# Patient Record
Sex: MALE | Race: WHITE | HISPANIC OR LATINO | Employment: FULL TIME | URBAN - METROPOLITAN AREA
[De-identification: names, ages, dates, MRNs, and addresses within clinical notes are randomized per-mention and may not be internally consistent; named-entity substitution may affect disease eponyms.]

---

## 2017-06-13 ENCOUNTER — OFFICE VISIT (OUTPATIENT)
Dept: URGENT CARE | Facility: URGENT CARE | Age: 22
End: 2017-06-13

## 2017-06-13 VITALS
BODY MASS INDEX: 24.61 KG/M2 | WEIGHT: 143.38 LBS | SYSTOLIC BLOOD PRESSURE: 113 MMHG | OXYGEN SATURATION: 100 % | HEART RATE: 64 BPM | TEMPERATURE: 97.3 F | DIASTOLIC BLOOD PRESSURE: 68 MMHG

## 2017-06-13 DIAGNOSIS — R07.89 MUSCULOSKELETAL CHEST PAIN: Primary | ICD-10-CM

## 2017-06-13 PROCEDURE — 99213 OFFICE O/P EST LOW 20 MIN: CPT | Performed by: PHYSICIAN ASSISTANT

## 2017-06-13 RX ORDER — IBUPROFEN 800 MG/1
800 TABLET, FILM COATED ORAL EVERY 8 HOURS PRN
Qty: 30 TABLET | Refills: 0 | Status: SHIPPED | OUTPATIENT
Start: 2017-06-13 | End: 2019-01-16

## 2017-06-13 RX ORDER — CYCLOBENZAPRINE HCL 5 MG
5 TABLET ORAL 3 TIMES DAILY PRN
Qty: 30 TABLET | Refills: 0 | Status: SHIPPED | OUTPATIENT
Start: 2017-06-13 | End: 2018-06-15

## 2017-06-13 NOTE — MR AVS SNAPSHOT
"              After Visit Summary   2017    Rian Pérez    MRN: [id number]           Patient Information     Date Of Birth          1995        Visit Information        Provider Department      2017 6:55 PM Valeri De Leon PA-C [facility]        Today's Diagnoses     Musculoskeletal chest pain    -  1       Follow-ups after your visit        Who to contact     If you have questions or need follow up information about today's clinic visit or your schedule please contact [facility] directly at [phone].  Normal or non-critical lab and imaging results will be communicated to you by Vecas[employer], letter or phone within 4 business days after the clinic has received the results. If you do not hear from us within 7 days, please contact the clinic through Vecasthart or phone. If you have a critical or abnormal lab result, we will notify you by phone as soon as possible.  Submit refill requests through [employer] or call your pharmacy and they will forward the refill request to us. Please allow 3 business days for your refill to be completed.          Additional Information About Your Visit        MyChart Information     [employer] lets you send messages to your doctor, view your test results, renew your prescriptions, schedule appointments and more. To sign up, go to [email or web address] . Click on \"Log in\" on the left side of the screen, which will take you to the Welcome page. Then click on \"Sign up Now\" on the right side of the page.     You will be asked to enter the access code listed below, as well as some personal information. Please follow the directions to create your username and password.     Your access code is: [email or web address]  Expires: 2017  5:29 PM     Your access code will  in 90 days. If you need help or a new code, please call your [place] clinic or [phone].        Care EveryWhere ID     This is your Care " EveryWhere ID. This could be used by other organizations to access your [place] medical records  [id number]        Your Vitals Were     Pulse Temperature Pulse Oximetry BMI (Body Mass Index)          64 97.3  F (36.3  C) (Oral) 100% 24.61 kg/m2         Blood Pressure from Last 3 Encounters:   06/13/17 113/68   09/02/16 122/73    Weight from Last 3 Encounters:   06/13/17 143 lb 6 oz (65 kg)   09/02/16 135 lb (61.2 kg)              Today, you had the following     No orders found for display         Today's Medication Changes          These changes are accurate as of: 6/13/17 11:59 PM.  If you have any questions, ask your nurse or doctor.               Start taking these medicines.        Dose/Directions    cyclobenzaprine 5 MG tablet   Commonly known as:  FLEXERIL   Used for:  Musculoskeletal chest pain   Started by:  Valeri De Leon PA-C        Dose:  5 mg   Take 1 tablet (5 mg) by mouth 3 times daily as needed   Quantity:  30 tablet   Refills:  0       ibuprofen 800 MG tablet   Commonly known as:  ADVIL/MOTRIN   Used for:  Musculoskeletal chest pain   Started by:  Valeri De Leon PA-C        Dose:  800 mg   Take 1 tablet (800 mg) by mouth every 8 hours as needed for moderate pain   Quantity:  30 tablet   Refills:  0            Where to get your medicines      Some of these will need a paper prescription and others can be bought over the counter.  Ask your nurse if you have questions.     Bring a paper prescription for each of these medications     cyclobenzaprine 5 MG tablet    ibuprofen 800 MG tablet                Primary Care Provider    Physician No Ref-Primary       No address on file        Thank you!     Thank you for choosing [place] [facility]  for your care. Our goal is always to provide you with excellent care. Hearing back from our patients is one way we can continue to improve our services. Please take a few minutes to complete the written survey that you  may receive in the mail after your visit with us. Thank you!             Your Updated Medication List - Protect others around you: Learn how to safely use, store and throw away your medicines at www.disposemymeds.org.          This list is accurate as of: 6/13/17 11:59 PM.  Always use your most recent med list.                   Brand Name Dispense Instructions for use    cyclobenzaprine 5 MG tablet    FLEXERIL    30 tablet    Take 1 tablet (5 mg) by mouth 3 times daily as needed       ibuprofen 800 MG tablet    ADVIL/MOTRIN    30 tablet    Take 1 tablet (800 mg) by mouth every 8 hours as needed for moderate pain

## 2017-06-14 NOTE — NURSING NOTE
"Chief Complaint   Patient presents with     Chest Pain     left side chest pain radiating to back since yesterday.        Initial /68 (BP Location: Right arm, Patient Position: Chair, Cuff Size: Adult Regular)  Pulse 64  Temp 97.3  F (36.3  C) (Oral)  Wt 143 lb 6 oz (65 kg)  SpO2 100%  BMI 24.61 kg/m2 Estimated body mass index is 24.61 kg/(m^2) as calculated from the following:    Height as of 9/2/16: 5' 4\" (1.626 m).    Weight as of this encounter: 143 lb 6 oz (65 kg).  Medication Reconciliation: complete    "

## 2017-06-15 NOTE — PROGRESS NOTES
SUBJECTIVE:  Chief Complaint   Patient presents with     Chest Pain     left side chest pain radiating to back since yesterday.      Rian Pérez is a 21 year old male presents with a chief complaint of left sided chest discomfort since yesterday, worse with movement.  No trauma.  No shortness of breath.  No cough.  No fevers.    Has been working out every day for the past week, doing upper body strengthening exercises.  Denies any nausea or diaphoresis.    He is otherwise in his usual state of health.    Denies any other symptoms.     Has not taken anything for the symptoms.      No past medical history on file.   Denies any significant PMH    There is no problem list on file for this patient.    Social History   Substance Use Topics     Smoking status: Former Smoker     Smokeless tobacco: Never Used     Alcohol use No       ROS:  CONSTITUTIONAL:NEGATIVE for fever, chills, change in weight  INTEGUMENTARY/SKIN: NEGATIVE for worrisome rashes, moles or lesions  ENT/MOUTH: NEGATIVE for ear, mouth and throat problems  RESP:NEGATIVE for significant cough or SOB  CV: NEGATIVE for chest pain, palpitations or peripheral edema  GI: NEGATIVE for nausea, abdominal pain, heartburn, or change in bowel habits  MUSCULOSKELETAL: as per HPI  NEURO: NEGATIVE for weakness, dizziness or paresthesias    EXAM:   /68 (BP Location: Right arm, Patient Position: Chair, Cuff Size: Adult Regular)  Pulse 64  Temp 97.3  F (36.3  C) (Oral)  Wt 143 lb 6 oz (65 kg)  SpO2 100%  BMI 24.61 kg/m2  Gen: healthy,alert,no distress  CHEST: clear to auscultation  Tenderness to palpation over pectoralis muscle on left.  Pain recreated with pressure on area.  Also with movement of left arm.   CV: regular rate and rhythm  EXTREMITIES: peripheral pulses normal  MS: no gross deformities noted, no evidence of inflammation in joints, FROM in all extremities.  SKIN: no suspicious lesions or rashes  NEURO: Normal strength and tone, sensory exam grossly  normal, mentation intact and speech normal    (R07.89) Musculoskeletal chest pain  (primary encounter diagnosis)  Comment:   Plan: ibuprofen (ADVIL/MOTRIN) 800 MG tablet,         cyclobenzaprine (FLEXERIL) 5 MG tablet        Gentle stretching of area, followed by ice.  Avoid upper body exercises until well healed.    Patient expresses understanding and agreement with the assessment and plan as above.    Patient expresses understanding and agreement with the assessment and plan as above.

## 2018-03-26 ENCOUNTER — OFFICE VISIT (OUTPATIENT)
Dept: URGENT CARE | Facility: URGENT CARE | Age: 23
End: 2018-03-26

## 2018-03-26 VITALS
HEART RATE: 77 BPM | SYSTOLIC BLOOD PRESSURE: 112 MMHG | BODY MASS INDEX: 26.61 KG/M2 | WEIGHT: 155 LBS | TEMPERATURE: 99.5 F | DIASTOLIC BLOOD PRESSURE: 64 MMHG

## 2018-03-26 DIAGNOSIS — Z02.83 ENCOUNTER FOR DRUG SCREENING: Primary | ICD-10-CM

## 2018-03-26 PROCEDURE — 36415 COLL VENOUS BLD VENIPUNCTURE: CPT | Performed by: FAMILY MEDICINE

## 2018-03-26 PROCEDURE — 80307 DRUG TEST PRSMV CHEM ANLYZR: CPT | Performed by: FAMILY MEDICINE

## 2018-03-26 PROCEDURE — 99000 SPECIMEN HANDLING OFFICE-LAB: CPT | Performed by: FAMILY MEDICINE

## 2018-03-26 PROCEDURE — 80307 DRUG TEST PRSMV CHEM ANLYZR: CPT | Mod: 90 | Performed by: FAMILY MEDICINE

## 2018-03-26 PROCEDURE — 99212 OFFICE O/P EST SF 10 MIN: CPT | Performed by: FAMILY MEDICINE

## 2018-03-26 ASSESSMENT — ENCOUNTER SYMPTOMS
CHILLS: 0
COUGH: 0
SORE THROAT: 0
FEVER: 0
DIARRHEA: 0
NAUSEA: 0
VOMITING: 0
RHINORRHEA: 0
SHORTNESS OF BREATH: 0
DIAPHORESIS: 0

## 2018-03-26 NOTE — MR AVS SNAPSHOT
After Visit Summary   3/26/2018    Rian Pérez    MRN: [id number]           Patient Information     Date Of Birth          1995        Visit Information        Provider Department      3/26/2018 5:00 PM  URGENT CARE [facility]        Today's Diagnoses     Encounter for drug screening    -  1      Care Instructions      Nonurgent Medical Screening Exam  You have had a medical screening exam. The results show that you don t have a condition that needs to be treated in the emergency department.  You can safely wait until you can see your healthcare provider for evaluation or treatment. It is up to you to make an appointment for follow-up care.  Medical emergencies  If you think you have a medical emergency, please come to the emergency department. That s what we are here for. A medical emergency might be severe pain. It might be a condition that gets worse. Or it might be problems with a pregnancy.  The emergency department is open to all who need treatment. But if you don t think you have a serious or life-threatening problem, try these other choices.  If you have a primary care doctor:  Call your doctor before coming to the emergency department.  After office hours, someone from your doctor s office is on-call by phone. The person on-call may be able to give you advice over the phone on how to take care of the problem  You may be able to get an appointment to see your doctor.  If you don t have a primary care doctor:  Call the referral doctor or clinic shown below during office hours. You should be able to make an appointment to be seen.  If you aren t sure whether you are having an emergency, you can always return to the emergency department to be looked at.   Phone advice from the emergency department  We are here 24 hours a day to give emergency care. But this hospital does not give phone advice for medical conditions. If you need advice for a condition that can t  "wait to be seen by your doctor, you will need to come back to this facility in person.  Date Last Reviewed: 2016-2017 The [employer]. [address] [place] [address], [place] [address]. All rights reserved. This information is not intended as a substitute for professional medical care. Always follow your healthcare professional's instructions.                Follow-ups after your visit        Who to contact     If you have questions or need follow up information about today's clinic visit or your schedule please contact [facility] directly at [phone].  Normal or non-critical lab and imaging results will be communicated to you by "[employer]"hart, letter or phone within 4 business days after the clinic has received the results. If you do not hear from us within 7 days, please contact the clinic through "[employer]"hart or phone. If you have a critical or abnormal lab result, we will notify you by phone as soon as possible.  Submit refill requests through [employer] or call your pharmacy and they will forward the refill request to us. Please allow 3 business days for your refill to be completed.          Additional Information About Your Visit        MyChart Information     [employer] lets you send messages to your doctor, view your test results, renew your prescriptions, schedule appointments and more. To sign up, go to [email or web address] . Click on \"Log in\" on the left side of the screen, which will take you to the Welcome page. Then click on \"Sign up Now\" on the right side of the page.     You will be asked to enter the access code listed below, as well as some personal information. Please follow the directions to create your username and password.     Your access code is: [email or web address]  Expires: 2018  5:12 PM     Your access code will  in 90 days. If you need help or a new code, please call your [facility] or [phone].        Care EveryWhere ID     This is your Care " EveryWhere ID. This could be used by other organizations to access your [place] medical records  [id number]        Your Vitals Were     Pulse Temperature BMI (Body Mass Index)             77 99.5  F (37.5  C) (Oral) 26.61 kg/m2          Blood Pressure from Last 3 Encounters:   03/26/18 112/64   06/13/17 113/68   09/02/16 122/73    Weight from Last 3 Encounters:   03/26/18 155 lb (70.3 kg)   06/13/17 143 lb 6 oz (65 kg)   09/02/16 135 lb (61.2 kg)              We Performed the Following     Drug  Screen Comprehensive , Urine with Reported Meds ([facility]) (Pain Care Package)     Drug screen comprehensive blood ([facility])        Primary Care Provider Fax #    Physician No Ref-Primary [phone]       No address on file        Equal Access to Services     NATHANAEL DAVILA : Lita Sosa, wamazin winslow, suleman gonzalesalamber quinn, fariha martin . So [facility] [phone].    ATENCIÓN: Si habla español, tiene a mckeon disposición servicios gratuitos de asistencia lingüística. Llame al [phone].    We comply with applicable federal civil rights laws and [place] laws. We do not discriminate on the basis of race, color, national origin, age, disability, sex, sexual orientation, or gender identity.            Thank you!     Thank you for choosing [facility]  for your care. Our goal is always to provide you with excellent care. Hearing back from our patients is one way we can continue to improve our services. Please take a few minutes to complete the written survey that you may receive in the mail after your visit with us. Thank you!             Your Updated Medication List - Protect others around you: Learn how to safely use, store and throw away your medicines at www.disposemymeds.org.          This list is accurate as of 3/26/18  5:12 PM.  Always use your most recent med list.                   Brand Name Dispense Instructions for use Diagnosis    cyclobenzaprine  5 MG tablet    FLEXERIL    30 tablet    Take 1 tablet (5 mg) by mouth 3 times daily as needed    Musculoskeletal chest pain       ibuprofen 800 MG tablet    ADVIL/MOTRIN    30 tablet    Take 1 tablet (800 mg) by mouth every 8 hours as needed for moderate pain    Musculoskeletal chest pain

## 2018-03-26 NOTE — PATIENT INSTRUCTIONS
Nonurgent Medical Screening Exam  You have had a medical screening exam. The results show that you don t have a condition that needs to be treated in the emergency department.  You can safely wait until you can see your healthcare provider for evaluation or treatment. It is up to you to make an appointment for follow-up care.  Medical emergencies  If you think you have a medical emergency, please come to the emergency department. That s what we are here for. A medical emergency might be severe pain. It might be a condition that gets worse. Or it might be problems with a pregnancy.  The emergency department is open to all who need treatment. But if you don t think you have a serious or life-threatening problem, try these other choices.  If you have a primary care doctor:  Call your doctor before coming to the emergency department.  After office hours, someone from your doctor s office is on-call by phone. The person on-call may be able to give you advice over the phone on how to take care of the problem  You may be able to get an appointment to see your doctor.  If you don t have a primary care doctor:  Call the referral doctor or clinic shown below during office hours. You should be able to make an appointment to be seen.  If you aren t sure whether you are having an emergency, you can always return to the emergency department to be looked at.   Phone advice from the emergency department  We are here 24 hours a day to give emergency care. But this hospital does not give phone advice for medical conditions. If you need advice for a condition that can t wait to be seen by your doctor, you will need to come back to this facility in person.  Date Last Reviewed: 9/1/2016 2000-2017 The [employer]. [address] [place] [address], [place] [address]. All rights reserved. This information is not intended as a substitute for professional medical care. Always follow your healthcare professional's instructions.

## 2018-03-26 NOTE — NURSING NOTE
"Chief Complaint   Patient presents with     Urgent Care     Drug / Alcohol Assessment     want lab work for drug test       Initial /64  Pulse 77  Temp 99.5  F (37.5  C) (Oral)  Wt 155 lb (70.3 kg)  BMI 26.61 kg/m2 Estimated body mass index is 26.61 kg/(m^2) as calculated from the following:    Height as of 9/2/16: 5' 4\" (1.626 m).    Weight as of this encounter: 155 lb (70.3 kg).  Medication Reconciliation: complete   Marianne RAZA MA       "

## 2018-03-26 NOTE — PROGRESS NOTES
SUBJECTIVE:   Rian Pérez is a 22 year old male presenting with a chief complaint of   Chief Complaint   Patient presents with     Urgent Care     Drug / Alcohol Assessment     want lab work for drug test       He is an established patient of [facility].    Here requesting a drug test for immigration exam.    Pt has to return to [place] in one week for immigration drug screen and he wants to make sure the test will be negative.  He would like serum and urine test for certainty.    Review of Systems   Constitutional: Negative for chills, diaphoresis and fever.   HENT: Negative for congestion, ear pain, rhinorrhea and sore throat.    Respiratory: Negative for cough and shortness of breath.    Gastrointestinal: Negative for diarrhea, nausea and vomiting.       No past medical history on file.  No family history on file.  Current Outpatient Prescriptions   Medication Sig Dispense Refill     ibuprofen (ADVIL/MOTRIN) 800 MG tablet Take 1 tablet (800 mg) by mouth every 8 hours as needed for moderate pain (Patient not taking: Reported on 3/26/2018) 30 tablet 0     cyclobenzaprine (FLEXERIL) 5 MG tablet Take 1 tablet (5 mg) by mouth 3 times daily as needed (Patient not taking: Reported on 3/26/2018) 30 tablet 0     Social History   Substance Use Topics     Smoking status: Former Smoker     Smokeless tobacco: Never Used     Alcohol use No       OBJECTIVE  /64  Pulse 77  Temp 99.5  F (37.5  C) (Oral)  Wt 155 lb (70.3 kg)  BMI 26.61 kg/m2    Physical Exam   Constitutional: He appears well-developed and well-nourished. No distress.   HENT:   Head: Normocephalic and atraumatic.   Eyes: EOM are normal. Pupils are equal, round, and reactive to light.   Pulmonary/Chest: Effort normal.   Neurological: He is alert.   Skin: Skin is warm and dry.   Psychiatric: He has a normal mood and affect. His behavior is normal. Thought content normal.       Labs:  No results found for this or any previous visit (from the past 24  hour(s)).    X-Ray was not done.    ASSESSMENT:    No diagnosis found.     Medical Decision Making:    Differential Diagnosis:  none    Serious Comorbid Conditions:  Adult:  None    PLAN:    At patient request -- we will get a urine and serum drug screen.    Followup:    If not improving or if condition worsens, follow up with your Primary Care Provider    There are no Patient Instructions on file for this visit.

## 2018-03-28 ENCOUNTER — NURSE TRIAGE (OUTPATIENT)
Dept: NURSING | Facility: CLINIC | Age: 23
End: 2018-03-28

## 2018-03-28 LAB
ACETAMINOPHEN QUAL: NEGATIVE
AMOBARBITAL QUAL: NEGATIVE
BARBITAL QUAL: NEGATIVE
BUTABARBITAL QUAL: NEGATIVE
BUTALBITAL QUAL: NEGATIVE
CAFFEINE QUAL: NEGATIVE
CARBAMAZEPINE QUAL: NEGATIVE
CARISOPRODOL QUAL: NEGATIVE
CHLORPROPAMIDE UR-MCNC: NEGATIVE UG/ML
DRUGS SERPL SCN: NEGATIVE
ETHCLORVYNOL QUAL: NEGATIVE
ETHINAMATE QUAL: NEGATIVE
ETHOSUXIMIDE QUAL: NEGATIVE
ETHOTOIN QUAL: NEGATIVE
GLUTETHIMIDE QUAL: NEGATIVE
IBUPROFEN QUAL: NEGATIVE
MEPHENYTOIN QUAL: NEGATIVE
MEPHOBARBITAL QUAL: NEGATIVE
MEPROBAMATE QUAL: NEGATIVE
METHAQUALONE QUAL: NEGATIVE
METHARBITAL QUAL: NEGATIVE
METHSUXIMIDE QUAL: NEGATIVE
METHYPRYLON QUAL: NEGATIVE
PENTOBARBITAL QUAL: NEGATIVE
PHENACETIN QUAL: NEGATIVE
PHENOBARBITAL QUAL: NEGATIVE
PHENSUXIMIDE QUAL: NEGATIVE
PHENYTOIN QUAL: NEGATIVE
PRIMIDONE QUAL: NEGATIVE
SALICYLATE QUAL: NEGATIVE
SECOBARBITAL QUAL: NEGATIVE
TALBUTAL QUAL: NEGATIVE
THEOPHYLLINE QUAL: NEGATIVE
THIOPENTAL QUAL: NEGATIVE
TYBAMATE QUAL: NEGATIVE
VALPROIC ACID QUAL: NEGATIVE

## 2018-03-28 NOTE — TELEPHONE ENCOUNTER
Wife and Rian is calling back for lab results.  FNA verified with Rian through phone that it is alright to give lab results.  FNA relayed results for Drug Screen blood draw.

## 2018-03-29 LAB — PAIN DRUG SCR UR W RPTD MEDS: NORMAL

## 2018-06-15 ENCOUNTER — OFFICE VISIT (OUTPATIENT)
Dept: INTERNAL MEDICINE | Facility: CLINIC | Age: 23
End: 2018-06-15

## 2018-06-15 VITALS
WEIGHT: 153 LBS | OXYGEN SATURATION: 98 % | DIASTOLIC BLOOD PRESSURE: 74 MMHG | RESPIRATION RATE: 18 BRPM | TEMPERATURE: 98.3 F | SYSTOLIC BLOOD PRESSURE: 108 MMHG | BODY MASS INDEX: 26.26 KG/M2 | HEART RATE: 90 BPM

## 2018-06-15 DIAGNOSIS — R43.8 BITTER TASTE: Primary | ICD-10-CM

## 2018-06-15 LAB
ALBUMIN SERPL-MCNC: 4.5 G/DL (ref 3.4–5)
ALP SERPL-CCNC: 70 U/L (ref 40–150)
ALT SERPL W P-5'-P-CCNC: 44 U/L (ref 0–70)
ANION GAP SERPL CALCULATED.3IONS-SCNC: 7 MMOL/L (ref 3–14)
AST SERPL W P-5'-P-CCNC: 20 U/L (ref 0–45)
BILIRUB SERPL-MCNC: 0.4 MG/DL (ref 0.2–1.3)
BUN SERPL-MCNC: 15 MG/DL (ref 7–30)
CALCIUM SERPL-MCNC: 9.4 MG/DL (ref 8.5–10.1)
CHLORIDE SERPL-SCNC: 103 MMOL/L (ref 94–109)
CO2 SERPL-SCNC: 30 MMOL/L (ref 20–32)
CREAT SERPL-MCNC: 0.78 MG/DL (ref 0.66–1.25)
ERYTHROCYTE [DISTWIDTH] IN BLOOD BY AUTOMATED COUNT: 12 % (ref 10–15)
GFR SERPL CREATININE-BSD FRML MDRD: >90 ML/MIN/1.7M2
GLUCOSE SERPL-MCNC: 96 MG/DL (ref 70–99)
HCT VFR BLD AUTO: 45.4 % (ref 40–53)
HGB BLD-MCNC: 15.2 G/DL (ref 13.3–17.7)
MCH RBC QN AUTO: 29.3 PG (ref 26.5–33)
MCHC RBC AUTO-ENTMCNC: 33.5 G/DL (ref 31.5–36.5)
MCV RBC AUTO: 88 FL (ref 78–100)
PLATELET # BLD AUTO: 239 10E9/L (ref 150–450)
POTASSIUM SERPL-SCNC: 4.2 MMOL/L (ref 3.4–5.3)
PROT SERPL-MCNC: 8.3 G/DL (ref 6.8–8.8)
RBC # BLD AUTO: 5.19 10E12/L (ref 4.4–5.9)
SODIUM SERPL-SCNC: 140 MMOL/L (ref 133–144)
WBC # BLD AUTO: 4.9 10E9/L (ref 4–11)

## 2018-06-15 PROCEDURE — 36415 COLL VENOUS BLD VENIPUNCTURE: CPT | Performed by: INTERNAL MEDICINE

## 2018-06-15 PROCEDURE — 80053 COMPREHEN METABOLIC PANEL: CPT | Performed by: INTERNAL MEDICINE

## 2018-06-15 PROCEDURE — 85027 COMPLETE CBC AUTOMATED: CPT | Performed by: INTERNAL MEDICINE

## 2018-06-15 PROCEDURE — 99213 OFFICE O/P EST LOW 20 MIN: CPT | Performed by: INTERNAL MEDICINE

## 2018-06-15 NOTE — MR AVS SNAPSHOT
After Visit Summary   6/15/2018    Rian Pérez    MRN: [id number]           Patient Information     Date Of Birth          1995        Visit Information        Provider Department      6/15/2018 2:00 PM Analisa Lee MD [facility]        Today's Diagnoses     Bitter taste    -  1      Care Instructions    Labs today.     ---    ENT referral placed - if labs come back normal/negative, they would be the next step.           Follow-ups after your visit        Additional Services     OTOLARYNGOLOGY REFERRAL       Your provider has referred you to: N: [facility] [place] [phone]   [email or web address]    Please be aware that coverage of these services is subject to the terms and limitations of your health insurance plan.  Call member services at your health plan with any benefit or coverage questions.      Please bring the following with you to your appointment:    (1) Any X-Rays, CTs or MRIs which have been performed.  Contact the facility where they were done to arrange for  prior to your scheduled appointment.   (2) List of current medications  (3) This referral request   (4) Any documents/labs given to you for this referral                  Who to contact     If you have questions or need follow up information about today's clinic visit or your schedule please contact [facility] directly at [phone].  Normal or non-critical lab and imaging results will be communicated to you by MyChart, letter or phone within 4 business days after the clinic has received the results. If you do not hear from us within 7 days, please contact the clinic through MyChart or phone. If you have a critical or abnormal lab result, we will notify you by phone as soon as possible.  Submit refill requests through [employer] or call your pharmacy and they will forward the refill request to us. Please allow 3 business  days for your refill to be completed.          Additional Information About Your Visit        MyChart Information     [employer] gives you secure access to your electronic health record. If you see a primary care provider, you can also send messages to your care team and make appointments. If you have questions, please call your primary care clinic.  If you do not have a primary care provider, please call [phone] and they will assist you.        Care EveryWhere ID     This is your Care EveryWhere ID. This could be used by other organizations to access your [place] medical records  [id number]        Your Vitals Were     Pulse Temperature Respirations Pulse Oximetry BMI (Body Mass Index)       90 98.3  F (36.8  C) (Oral) 18 98% 26.26 kg/m2        Blood Pressure from Last 3 Encounters:   06/15/18 108/74   03/26/18 112/64   06/13/17 113/68    Weight from Last 3 Encounters:   06/15/18 153 lb (69.4 kg)   03/26/18 155 lb (70.3 kg)   06/13/17 143 lb 6 oz (65 kg)              We Performed the Following     CBC with platelets     Comprehensive metabolic panel     OTOLARYNGOLOGY REFERRAL        Primary Care Provider Fax #    Physician No Ref-Primary [phone]       No address on file        Equal Access to Services     NATHANAEL DAVILA : Hadii anne heltono Somarsha, waaxda luqadaha, qaybta kaalmada adeshellieyada, fariha ulrich. So [facility] [phone].    ATENCIÓN: Si habla español, tiene a mckeon disposición servicios gratuitos de asistencia lingüística. Llame al [phone].    We comply with applicable federal civil rights laws and [place] laws. We do not discriminate on the basis of race, color, national origin, age, disability, sex, sexual orientation, or gender identity.            Thank you!     Thank you for choosing [facility]  for your care. Our goal is always to provide you with excellent care. Hearing back from our patients is one way we can continue to improve our  services. Please take a few minutes to complete the written survey that you may receive in the mail after your visit with us. Thank you!             Your Updated Medication List - Protect others around you: Learn how to safely use, store and throw away your medicines at www.disposemymeds.org.          This list is accurate as of 6/15/18  2:21 PM.  Always use your most recent med list.                   Brand Name Dispense Instructions for use Diagnosis    cyclobenzaprine 5 MG tablet    FLEXERIL    30 tablet    Take 1 tablet (5 mg) by mouth 3 times daily as needed    Musculoskeletal chest pain       ibuprofen 800 MG tablet    ADVIL/MOTRIN    30 tablet    Take 1 tablet (800 mg) by mouth every 8 hours as needed for moderate pain    Musculoskeletal chest pain

## 2018-06-15 NOTE — PATIENT INSTRUCTIONS
Labs today.     ---    ENT referral placed - if labs come back normal/negative, they would be the next step.

## 2018-06-15 NOTE — PROGRESS NOTES
SUBJECTIVE:                                                      HPI: Rian Pérez is a pleasant 22 year old male who presents with a bitter taste in his mouth:    Accompanied by wife, who assists with history.    Started 4 days ago, but has had it off and on in the past.    Bitter taste involves his tongue, not necessarily in the back of his throat.    No loss of taste or smell. Food smells and tastes normal.    Denies acid reflux, hoarseness, or cough.  Denies sinus congestion, rhinitis, or postnasal drip.  Denies halitosis.    No new medications or diet changes.    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    The medication, allergy, and problem lists have been reviewed and updated as appropriate.       OBJECTIVE:                                                      /74  Pulse 90  Temp 98.3  F (36.8  C) (Oral)  Resp 18  Wt 153 lb (69.4 kg)  SpO2 98%  BMI 26.26 kg/m2  Constitutional: well-appearing  Throat: no oropharyngeal lesions or ulcers; dentition and tongue normal; no tonsillar exudates; small tonsilliths bilaterall      ASSESSMENT/PLAN:                                                      (R43.8) Bitter taste  (primary encounter diagnosis)  Comment: etiology unclear.   Plan:    - CBC and CMP today.    - if labs unrevealing, ENT referral provided today.     The instructions on the AVS were discussed and explained to the patient. Patient expressed understanding of instructions.    (Chart documentation was completed, in part, with [employer] voice-recognition software. Even though reviewed, some grammatical, spelling, and word errors may remain.)    Analisa Lee MD   [address] [address]  T: [phone], F: [phone]

## 2019-01-16 ENCOUNTER — OFFICE VISIT (OUTPATIENT)
Dept: INTERNAL MEDICINE | Facility: CLINIC | Age: 24
End: 2019-01-16
Payer: COMMERCIAL

## 2019-01-16 ENCOUNTER — ANCILLARY PROCEDURE (OUTPATIENT)
Dept: GENERAL RADIOLOGY | Facility: CLINIC | Age: 24
End: 2019-01-16
Payer: COMMERCIAL

## 2019-01-16 VITALS
SYSTOLIC BLOOD PRESSURE: 126 MMHG | RESPIRATION RATE: 16 BRPM | DIASTOLIC BLOOD PRESSURE: 76 MMHG | HEIGHT: 64 IN | HEART RATE: 82 BPM | WEIGHT: 149 LBS | BODY MASS INDEX: 25.44 KG/M2 | OXYGEN SATURATION: 98 % | TEMPERATURE: 98.1 F

## 2019-01-16 DIAGNOSIS — M25.512 CHRONIC LEFT SHOULDER PAIN: Primary | ICD-10-CM

## 2019-01-16 DIAGNOSIS — G89.29 CHRONIC LEFT SHOULDER PAIN: Primary | ICD-10-CM

## 2019-01-16 DIAGNOSIS — G89.29 CHRONIC LEFT SHOULDER PAIN: ICD-10-CM

## 2019-01-16 DIAGNOSIS — M25.512 CHRONIC LEFT SHOULDER PAIN: ICD-10-CM

## 2019-01-16 PROCEDURE — 99213 OFFICE O/P EST LOW 20 MIN: CPT | Performed by: PHYSICIAN ASSISTANT

## 2019-01-16 PROCEDURE — 73030 X-RAY EXAM OF SHOULDER: CPT | Mod: LT

## 2019-01-16 ASSESSMENT — MIFFLIN-ST. JEOR: SCORE: 1571.86

## 2019-01-16 NOTE — PROGRESS NOTES
"  SUBJECTIVE:   Rian Pérez is a 23 year old male who presents to clinic today for the following health issues:    Joint Pain    Onset: 1- 1/2 years but its on/off    Description:   Location: left shoulder  Character: Sharp    Intensity: 3/10 currently, 7/10 at its worst    Progression of Symptoms: same, intermittent    Accompanying Signs & Symptoms:  Other symptoms: radiation of pain to armpit and back area and numbness    History:   Previous similar pain: YES- has been happening for 1 year      Precipitating factors:   Trauma or overuse: YES- gym    Alleviating factors:  Improved by: rest/inactivity    Therapies Triedand outcome: resting helps     visit for left chest wall pain in 6/2017      -------------------------------------    Problem list and histories reviewed & adjusted, as indicated.  Additional history: as documented    Labs reviewed in EPIC    Reviewed and updated as needed this visit by clinical staff  Tobacco  Allergies  Meds  Med Hx  Surg Hx  Fam Hx  Soc Hx      Reviewed and updated as needed this visit by Provider  Allergies  Meds         ROS:  Constitutional, HEENT, cardiovascular, pulmonary, gi and gu systems are negative, except as otherwise noted.    OBJECTIVE:     /76   Pulse 82   Temp 98.1  F (36.7  C) (Oral)   Resp 16   Ht 1.626 m (5' 4\")   Wt 67.6 kg (149 lb)   SpO2 98%   BMI 25.58 kg/m    Body mass index is 25.58 kg/m .  GENERAL: healthy, alert and no distress  NECK: no adenopathy, no asymmetry, masses, or scars and thyroid normal to palpation  RESP: lungs clear to auscultation - no rales, rhonchi or wheezes  CV: regular rates and rhythm and normal S1 S2, no S3 or S4  MS: left shoulder  Mild tenderness near the ac joint/ proximal biceps  Some pain with flexion of biceps noted.   No pain with overhead flexion/extension internal or external rotation  Empty can test negative  Strength equal   SKIN: no suspicious lesions or rashes    Diagnostic Test Results:  Pending "     ASSESSMENT/PLAN:             1. Chronic left shoulder pain    - ORTHOPEDICS ADULT REFERRAL  - XR Shoulder Left G/E 3 Views; Future    Xray today  See ortho given many months of pain issues.        Tri Gregory PA-C  [facility]

## 2019-10-28 ENCOUNTER — OFFICE VISIT (OUTPATIENT)
Dept: FAMILY MEDICINE | Facility: CLINIC | Age: 24
End: 2019-10-28
Payer: COMMERCIAL

## 2019-10-28 VITALS
RESPIRATION RATE: 14 BRPM | HEART RATE: 77 BPM | SYSTOLIC BLOOD PRESSURE: 120 MMHG | BODY MASS INDEX: 25.44 KG/M2 | DIASTOLIC BLOOD PRESSURE: 78 MMHG | OXYGEN SATURATION: 100 % | HEIGHT: 64 IN | TEMPERATURE: 98.7 F | WEIGHT: 149 LBS

## 2019-10-28 DIAGNOSIS — R33.9 INCOMPLETE BLADDER EMPTYING: ICD-10-CM

## 2019-10-28 DIAGNOSIS — R30.0 DYSURIA: Primary | ICD-10-CM

## 2019-10-28 DIAGNOSIS — N47.8 DISORDER OF FORESKIN: ICD-10-CM

## 2019-10-28 LAB
ALBUMIN UR-MCNC: NEGATIVE MG/DL
APPEARANCE UR: CLEAR
BILIRUB UR QL STRIP: NEGATIVE
COLOR UR AUTO: YELLOW
GLUCOSE UR STRIP-MCNC: NEGATIVE MG/DL
HGB UR QL STRIP: NEGATIVE
KETONES UR STRIP-MCNC: NEGATIVE MG/DL
LEUKOCYTE ESTERASE UR QL STRIP: NEGATIVE
NITRATE UR QL: NEGATIVE
PH UR STRIP: 7 PH (ref 5–7)
RBC #/AREA URNS AUTO: NORMAL /HPF
SOURCE: NORMAL
SP GR UR STRIP: 1.02 (ref 1–1.03)
UROBILINOGEN UR STRIP-ACNC: 1 EU/DL (ref 0.2–1)
WBC #/AREA URNS AUTO: NORMAL /HPF

## 2019-10-28 PROCEDURE — 87591 N.GONORRHOEAE DNA AMP PROB: CPT | Performed by: FAMILY MEDICINE

## 2019-10-28 PROCEDURE — 87491 CHLMYD TRACH DNA AMP PROBE: CPT | Performed by: FAMILY MEDICINE

## 2019-10-28 PROCEDURE — 81001 URINALYSIS AUTO W/SCOPE: CPT | Performed by: FAMILY MEDICINE

## 2019-10-28 PROCEDURE — 99214 OFFICE O/P EST MOD 30 MIN: CPT | Performed by: FAMILY MEDICINE

## 2019-10-28 ASSESSMENT — MIFFLIN-ST. JEOR: SCORE: 1566.86

## 2019-10-28 NOTE — PATIENT INSTRUCTIONS
The patient's exam today is normal.  I encouraged him to increase his water intake to see if the dysuria goes away.  With respect to the incomplete emptying of his bladder we will see how increasing water intake does and see if his other tests come back with any helpful signs.  If not I would then be willing to send him over to urology for a consultation.  With the painful foreskin retraction with erections I suggested that he retract the foreskin every time he empties his bladder which he has not been doing.  The foreskin today was relatively easy to retract.   What Is The Reason For Today's Visit?: Excessive sun exposure

## 2019-10-28 NOTE — PROGRESS NOTES
"Daisy Pérez is a 24 year old male who presents to clinic today for the following health issues:    HPI   Genitourinary symptoms      Duration: X1 week    Description:  dysuria, frequency and does not feel like he is able to empty his bladder    Intensity:  mild, moderate    Accompanying signs and symptoms (fever/discharge/nausea/vomiting/back or abdominal pain):  None    History (frequent UTI's/kidney stones/prostate problems): None  Sexually active: YES and when he does get an erection in the foreskin pulls back it can sometimes be a little painful.    Precipitating or alleviating factors: None    Therapies tried and outcome: none   Outcome: N/A        There is no problem list on file for this patient.    History reviewed. No pertinent surgical history.    Social History     Tobacco Use     Smoking status: Former Smoker     Packs/day: 0.00     Smokeless tobacco: Never Used   Substance Use Topics     Alcohol use: No     Family History   Problem Relation Age of Onset     GERD Father              Reviewed and updated as needed this visit by Provider         Review of Systems   ROS COMP: Constitutional, HEENT, cardiovascular, pulmonary, gi and gu systems are negative, except as otherwise noted.      Objective    /78 (Patient Position: Sitting, Cuff Size: Adult Regular)   Pulse 77   Temp 98.7  F (37.1  C) (Tympanic)   Resp 14   Ht 1.626 m (5' 4\")   Wt 67.6 kg (149 lb)   SpO2 100%   BMI 25.58 kg/m    Body mass index is 25.58 kg/m .  Physical Exam   GENERAL APPEARANCE: healthy, alert and no distress   (male): testicles normal without atrophy or masses, no hernias and penis normal without urethral discharge    Results for orders placed or performed in visit on 10/28/19 (from the past 24 hour(s))   UA with Microscopic reflex to Culture   Result Value Ref Range    Color Urine Yellow     Appearance Urine Clear     Glucose Urine Negative NEG^Negative mg/dL    Bilirubin Urine Negative " "NEG^Negative    Ketones Urine Negative NEG^Negative mg/dL    Specific Gravity Urine 1.020 1.003 - 1.035    pH Urine 7.0 5.0 - 7.0 pH    Protein Albumin Urine Negative NEG^Negative mg/dL    Urobilinogen Urine 1.0 0.2 - 1.0 EU/dL    Nitrite Urine Negative NEG^Negative    Blood Urine Negative NEG^Negative    Leukocyte Esterase Urine Negative NEG^Negative    Source Midstream Urine     WBC Urine 0 - 5 OTO5^0 - 5 /HPF    RBC Urine O - 2 OTO2^O - 2 /HPF           Assessment & Plan       ICD-10-CM    1. Dysuria R30.0 UA with Microscopic reflex to Culture     NEISSERIA GONORRHOEA PCR     CHLAMYDIA TRACHOMATIS PCR   2. Disorder of foreskin N47.8     Discomfort with erection   3. Incomplete bladder emptying R33.9     Sensation         BMI:   Estimated body mass index is 25.58 kg/m  as calculated from the following:    Height as of this encounter: 1.626 m (5' 4\").    Weight as of this encounter: 67.6 kg (149 lb).           Patient Instructions   The patient's exam today is normal.  I encouraged him to increase his water intake to see if the dysuria goes away.  With respect to the incomplete emptying of his bladder we will see how increasing water intake does and see if his other tests come back with any helpful signs.  If not I would then be willing to send him over to urology for a consultation.  With the painful foreskin retraction with erections I suggested that he retract the foreskin every time he empties his bladder which he has not been doing.  The foreskin today was relatively easy to retract.      Return in about 2 weeks (around 11/11/2019) for If symptoms persist.    Sebas Pettit MD  [facility]      "

## 2019-10-29 ENCOUNTER — OFFICE VISIT (OUTPATIENT)
Dept: DERMATOLOGY | Facility: CLINIC | Age: 24
End: 2019-10-29
Payer: COMMERCIAL

## 2019-10-29 VITALS — HEART RATE: 84 BPM | DIASTOLIC BLOOD PRESSURE: 81 MMHG | OXYGEN SATURATION: 100 % | SYSTOLIC BLOOD PRESSURE: 129 MMHG

## 2019-10-29 DIAGNOSIS — D18.01 ANGIOMA OF SKIN: ICD-10-CM

## 2019-10-29 DIAGNOSIS — L74.519 FOCAL HYPERHIDROSIS: ICD-10-CM

## 2019-10-29 DIAGNOSIS — L73.9 FOLLICULITIS: Primary | ICD-10-CM

## 2019-10-29 LAB
C TRACH DNA SPEC QL NAA+PROBE: NEGATIVE
N GONORRHOEA DNA SPEC QL NAA+PROBE: NEGATIVE
SPECIMEN SOURCE: NORMAL
SPECIMEN SOURCE: NORMAL

## 2019-10-29 PROCEDURE — 87070 CULTURE OTHR SPECIMN AEROBIC: CPT | Performed by: PHYSICIAN ASSISTANT

## 2019-10-29 PROCEDURE — 99203 OFFICE O/P NEW LOW 30 MIN: CPT | Performed by: PHYSICIAN ASSISTANT

## 2019-10-29 RX ORDER — CLINDAMYCIN PHOSPHATE 10 MG/G
GEL TOPICAL
Qty: 60 G | Refills: 11 | Status: SHIPPED | OUTPATIENT
Start: 2019-10-29 | End: 2021-05-27

## 2019-10-29 RX ORDER — GLYCOPYRROLATE 2 MG/1
TABLET ORAL
Qty: 90 TABLET | Refills: 3 | Status: SHIPPED | OUTPATIENT
Start: 2019-10-29 | End: 2020-01-20

## 2019-10-29 RX ORDER — DOXYCYCLINE 100 MG/1
CAPSULE ORAL
Qty: 60 CAPSULE | Refills: 1 | Status: SHIPPED | OUTPATIENT
Start: 2019-10-29 | End: 2020-01-20

## 2019-10-29 NOTE — LETTER
10/29/2019         RE: Rian Pérez  [address] [facility] [address]        Dear Colleague,    Thank you for referring your patient, Rian Pérez, [place] [facility] Please see a copy of my visit note below.    HPI:   Chief complaints: Rian Pérez is a 24 year old male who presents for evaluation of acne on scalp.  Condition present for:  4 years.   Previous treatments include: selsun blue and technology brand    Additional concern:     Excessive sweating in arm pits for the last 5 months. Sweating at night while asleep. Has tried drysol, not helpful     Review Of Systems  Eyes: negative  Ears/Nose/Throat: negative  Respiratory: No shortness of breath, dyspnea on exertion, cough, or hemoptysis  Cardiovascular: negative  Gastrointestinal: negative  Genitourinary: negative  Musculoskeletal: negative  Neurologic: negative  Psychiatric: negative    This document serves as a record of the services and decisions personally performed and made by Ananya Marcial, MS, PA-C. It was created on her behalf by Leilani Rowe, a trained medical scribe. The creation of this document is based on the provider's statements to the medical scribe.  Leilani Rowe 4:00 PM October 29, 2019    PHYSICAL EXAM:    /81   Pulse 84   SpO2 100%   Skin exam performed as follows: Type 4 skin. Mood appropriate  Alert and Oriented X 3. Well developed, well nourished in no distress.  General appearance: Normal  Head including face: Normal  Eyes: conjunctiva and lids: Normal  Mouth: Lips, teeth, gums: Normal  Neck: Normal  Chest-breast/axillae: Normal  Back: Normal  Spleen and liver: Normal  Cardiovascular: Exam of peripheral vascular system by observation for swelling, varicosities, edema: Normal  Genitalia: groin, buttocks: Normal  Extremities: digits/nails (clubbing): Normal  Eccrine and Apocrine glands: Normal  Right upper extremity: Normal  Left upper extremity: Normal  Right  lower extremity: Normal  Left lower extremity: Normal  Skin: Scalp and body hair: See below    1. Pustules in the scalp    ASSESSMENT/PLAN:     1. Folliculitis- on the scalp  --Culture taken today  --Start OTC BPO gel mixed 1:1 with clindamycin gel - apply to AA BID  --Start doxycycline 100 mg BID. Advised to take with food. Discussed risk of GI upset, esophagitis and photosensitivity.   2. 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   --Start robinul 1 tab up to 3 times daily  3. Angiomas- advised benign, no treatment needed.        Follow-up: 2 months  CC:   Scribed By: Leilani Rowe, Medical Scribe    The information in this document, created by the medical scribe for me, accurately reflects the services I personally performed and the decisions made by me. I have reviewed and approved this document for accuracy prior to leaving the patient care area.  October 29, 2019 4:13 PM    Ananya Marcial MS, PAPaulaC        Again, thank you for allowing me to participate in the care of your patient.        Sincerely,        Ananya Marcial PA-C

## 2019-10-29 NOTE — PATIENT INSTRUCTIONS
For scalp this folliculitis:     Mix an over the counter benzoyl peroxide with clindamycin gel (prescription) and apply to active areas twice per day    Start Start doxycycline 100 mg twice daily. Take with food and full glass of water.     Follow up in 2 months    For sweating:    Start robinul 1-3 times daily

## 2019-10-29 NOTE — PROGRESS NOTES
HPI:   Chief complaints: Rian Pérez is a 24 year old male who presents for evaluation of acne on scalp.  Condition present for:  4 years.   Previous treatments include: selsun blue and technology brand    Additional concern:     Excessive sweating in arm pits for the last 5 months. Sweating at night while asleep. Has tried drysol, not helpful     Review Of Systems  Eyes: negative  Ears/Nose/Throat: negative  Respiratory: No shortness of breath, dyspnea on exertion, cough, or hemoptysis  Cardiovascular: negative  Gastrointestinal: negative  Genitourinary: negative  Musculoskeletal: negative  Neurologic: negative  Psychiatric: negative    This document serves as a record of the services and decisions personally performed and made by Ananya Marcial, MS, PA-C. It was created on her behalf by Leilani Rowe, a trained medical scribe. The creation of this document is based on the provider's statements to the medical scribe.  Leilani Rowe 4:00 PM October 29, 2019    PHYSICAL EXAM:    /81   Pulse 84   SpO2 100%   Skin exam performed as follows: Type 4 skin. Mood appropriate  Alert and Oriented X 3. Well developed, well nourished in no distress.  General appearance: Normal  Head including face: Normal  Eyes: conjunctiva and lids: Normal  Mouth: Lips, teeth, gums: Normal  Neck: Normal  Chest-breast/axillae: Normal  Back: Normal  Spleen and liver: Normal  Cardiovascular: Exam of peripheral vascular system by observation for swelling, varicosities, edema: Normal  Genitalia: groin, buttocks: Normal  Extremities: digits/nails (clubbing): Normal  Eccrine and Apocrine glands: Normal  Right upper extremity: Normal  Left upper extremity: Normal  Right lower extremity: Normal  Left lower extremity: Normal  Skin: Scalp and body hair: See below    1. Pustules in the scalp    ASSESSMENT/PLAN:     1. Folliculitis- on the scalp  --Culture taken today  --Start OTC BPO gel mixed 1:1 with clindamycin gel -  apply to AA BID  --Start doxycycline 100 mg BID. Advised to take with food. Discussed risk of GI upset, esophagitis and photosensitivity.   2. 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   --Start robinul 1 tab up to 3 times daily  3. Angiomas- advised benign, no treatment needed.        Follow-up: 2 months  CC:   Scribed By: Leilani Rowe, Medical Scribe    The information in this document, created by the medical scribe for me, accurately reflects the services I personally performed and the decisions made by me. I have reviewed and approved this document for accuracy prior to leaving the patient care area.  October 29, 2019 4:13 PM    Ananya Marcial MS, PA-C

## 2019-10-29 NOTE — RESULT ENCOUNTER NOTE
Dear Rian,    Your tests were all normal. A copy of your tests are available in My Chart.    Glad to see you at your appointment.  If you have any questions feel free to call.      Sincerely,      JOHN Watts.

## 2019-10-31 ENCOUNTER — TELEPHONE (OUTPATIENT)
Dept: DERMATOLOGY | Facility: CLINIC | Age: 24
End: 2019-10-31

## 2019-10-31 LAB
BACTERIA SPEC CULT: NORMAL
Lab: NORMAL
SPECIMEN SOURCE: NORMAL

## 2019-10-31 NOTE — LETTER
[facility]  [address] [facility] [address]  Phone: [phone]    November 13, 2019      Rian Pérez                                                                                                                [address] [place] [address]            Dear Mr. Pérez,    We have been trying to get a hold of you in regards to your last Dermatology appointment.    Please give us a call back at your earliest convenience.      Thank you,      [facility]

## 2019-10-31 NOTE — TELEPHONE ENCOUNTER
Prior Authorization Retail Medication Request    Medication/Dose: Clindamycin Phosphate 1% Gel  ICD code (if different than what is on RX):    Previously Tried and Failed:    Rationale:      Insurance Name:  [facility]  Insurance ID:  [id number]      PRODUCT OR SERVICE NOT COVERED.  PLEASE  CONTACT PRESCRIBER    Please initiate prior authorization or send down alternate medication for the patient.    Thank you    Elissa Rojas,[facility]

## 2019-11-04 NOTE — TELEPHONE ENCOUNTER
Central Prior Authorization Team   Phone: [phone]      PA Initiation    Medication: Clindamycin Phosphate 1% Gel-Initiated  Insurance Company: [employer] - Phone [phone] Fax [phone]  Pharmacy Filling the Rx: [address]  Filling Pharmacy Phone: [phone]  Filling Pharmacy Fax:    Start Date: 11/4/2019

## 2019-11-04 NOTE — TELEPHONE ENCOUNTER
PRIOR AUTHORIZATION DENIED    Medication: Clindamycin Phosphate 1% Gel-DENIED    Denial Date: 11/4/2019    Denial Rational: Patient needs to have an FDA approved condition or a CMS accepted condition for medication.        Appeal Information:

## 2019-11-05 NOTE — TELEPHONE ENCOUNTER
Called patient and left message to call back. Please give message below from provider that PA was denied when patient calls back.

## 2019-11-12 NOTE — TELEPHONE ENCOUNTER
Called patient and left message to call back. Please give message below to patient when he calls back.

## 2019-11-12 NOTE — TELEPHONE ENCOUNTER
Unable to reach pt. Left non-detailed message to call back. Please let pt know about PA denied and have pt call insurance to find out what is covered under his plan. Thank you.    Kaylin Brown MA

## 2019-11-13 NOTE — TELEPHONE ENCOUNTER
After 3 phone call attempts, snail mail letter sent:    Dear Mr. Pérez,    We have been trying to get a hold of you in regards to your last Dermatology appointment.    Please give us a call back at your earliest convenience.      Thank you,      [facility]

## 2020-01-20 ENCOUNTER — MYC REFILL (OUTPATIENT)
Dept: DERMATOLOGY | Facility: CLINIC | Age: 25
End: 2020-01-20

## 2020-01-20 DIAGNOSIS — L74.519 FOCAL HYPERHIDROSIS: ICD-10-CM

## 2020-01-20 DIAGNOSIS — L73.9 FOLLICULITIS: ICD-10-CM

## 2020-01-20 RX ORDER — DOXYCYCLINE 100 MG/1
CAPSULE ORAL
Qty: 60 CAPSULE | Refills: 1
Start: 2020-01-20

## 2020-01-20 RX ORDER — DOXYCYCLINE 100 MG/1
CAPSULE ORAL
Qty: 60 CAPSULE | Refills: 1 | Status: SHIPPED | OUTPATIENT
Start: 2020-01-20 | End: 2021-05-27

## 2020-01-20 RX ORDER — GLYCOPYRROLATE 2 MG/1
TABLET ORAL
Qty: 90 TABLET | Refills: 3 | Status: SHIPPED | OUTPATIENT
Start: 2020-01-20 | End: 2021-05-27

## 2020-01-20 NOTE — TELEPHONE ENCOUNTER
Called and spoke to patient.    Patients acne has returned since stopping doxy.    Informed patient I would let the provider know and call back.    Patient voiced understanding.    Marnie RN-BSN-PHN  [facility]  [phone]

## 2020-01-20 NOTE — LETTER
[address] [address]  Phone: [phone]  Fax: [phone]    January 20, 2020      Rian Pérez                                                                                                                    [address] [place] [address]            Dear Mr. Pérez,    Many medications require routine follow-up with your Doctor.      At this time we ask that: You schedule a routine office visit with Ananya Marcial PA-C in Dermatology.    Your prescription: Has been refilled for 1 month so you may have time for the above noted follow-up.      Thank you,    [facility]

## 2020-01-20 NOTE — TELEPHONE ENCOUNTER
Refill approved for the robinul but I'd like him to be off the doxy unless he is flaring. Has his scalp been flaring?

## 2020-01-20 NOTE — TELEPHONE ENCOUNTER
Snail mail letter sent:    Dear Mr. Pérez,    Many medications require routine follow-up with your Doctor.      At this time we ask that: You schedule a routine office visit with Ananya Marcial PA-C in Dermatology.    Your prescription: Has been refilled for 1 month so you may have time for the above noted follow-up.      Thank you,    [facility]

## 2020-03-10 ENCOUNTER — HEALTH MAINTENANCE LETTER (OUTPATIENT)
Age: 25
End: 2020-03-10

## 2020-05-08 ENCOUNTER — E-VISIT (OUTPATIENT)
Dept: INTERNAL MEDICINE | Facility: CLINIC | Age: 25
End: 2020-05-08
Payer: COMMERCIAL

## 2020-05-08 DIAGNOSIS — N48.9 PENIS SYMPTOM OR SIGN: Primary | ICD-10-CM

## 2020-05-08 PROCEDURE — 99421 OL DIG E/M SVC 5-10 MIN: CPT | Performed by: INTERNAL MEDICINE

## 2020-06-01 ENCOUNTER — E-VISIT (OUTPATIENT)
Dept: INTERNAL MEDICINE | Facility: CLINIC | Age: 25
End: 2020-06-01
Payer: COMMERCIAL

## 2020-06-01 DIAGNOSIS — N48.9 PENIS SYMPTOM OR SIGN: Primary | ICD-10-CM

## 2020-06-01 PROCEDURE — 99421 OL DIG E/M SVC 5-10 MIN: CPT | Performed by: INTERNAL MEDICINE

## 2020-11-17 ENCOUNTER — OFFICE VISIT (OUTPATIENT)
Dept: DERMATOLOGY | Facility: CLINIC | Age: 25
End: 2020-11-17
Payer: COMMERCIAL

## 2020-11-17 VITALS — SYSTOLIC BLOOD PRESSURE: 133 MMHG | DIASTOLIC BLOOD PRESSURE: 78 MMHG | HEART RATE: 109 BPM | OXYGEN SATURATION: 100 %

## 2020-11-17 DIAGNOSIS — L74.519 FOCAL HYPERHIDROSIS: Primary | ICD-10-CM

## 2020-11-17 DIAGNOSIS — L73.9 FOLLICULITIS: ICD-10-CM

## 2020-11-17 PROCEDURE — 99214 OFFICE O/P EST MOD 30 MIN: CPT | Performed by: PHYSICIAN ASSISTANT

## 2020-11-17 RX ORDER — OXYBUTYNIN CHLORIDE 5 MG/1
5 TABLET ORAL 3 TIMES DAILY
Qty: 90 TABLET | Refills: 3 | Status: SHIPPED | OUTPATIENT
Start: 2020-11-17 | End: 2022-01-28

## 2020-11-17 RX ORDER — KETOCONAZOLE 20 MG/ML
SHAMPOO TOPICAL
Qty: 120 ML | Refills: 11 | Status: SHIPPED | OUTPATIENT
Start: 2020-11-17 | End: 2022-01-28

## 2020-11-17 RX ORDER — DOXYCYCLINE HYCLATE 20 MG
TABLET ORAL
Qty: 60 TABLET | Refills: 11 | Status: SHIPPED | OUTPATIENT
Start: 2020-11-17 | End: 2020-11-17

## 2020-11-17 RX ORDER — KETOCONAZOLE 20 MG/ML
SHAMPOO TOPICAL
Qty: 120 ML | Refills: 11 | Status: SHIPPED | OUTPATIENT
Start: 2020-11-17 | End: 2020-11-17

## 2020-11-17 RX ORDER — ALUMINUM CHLORIDE 20 %
SOLUTION, NON-ORAL TOPICAL
Qty: 60 ML | Refills: 11 | Status: SHIPPED | OUTPATIENT
Start: 2020-11-17 | End: 2020-11-17

## 2020-11-17 RX ORDER — ALUMINUM CHLORIDE 20 %
SOLUTION, NON-ORAL TOPICAL
Qty: 60 ML | Refills: 11 | Status: SHIPPED | OUTPATIENT
Start: 2020-11-17 | End: 2022-04-05

## 2020-11-17 RX ORDER — OXYBUTYNIN CHLORIDE 5 MG/1
5 TABLET ORAL 3 TIMES DAILY
Qty: 90 TABLET | Refills: 3 | Status: SHIPPED | OUTPATIENT
Start: 2020-11-17 | End: 2020-11-17

## 2020-11-17 RX ORDER — DOXYCYCLINE HYCLATE 20 MG
TABLET ORAL
Qty: 60 TABLET | Refills: 11 | Status: SHIPPED | OUTPATIENT
Start: 2020-11-17 | End: 2021-05-27

## 2020-11-17 NOTE — LETTER
11/17/2020         RE: Rian Pérez  [address]  [facility] [address]        Dear Colleague,    Thank you for referring your patient, Rian Pérez, to the [facility]. Please see a copy of my visit note below.    HPI:   Chief complaints: Rina Pérez is a 24 year old male who presents for recheck of scalp folliculitis. He reports that the doxycycline worked very well, but the pimples returned when he stopped. The clindamycin was not covered by his insurance so he did not use this.     Additionally his hyperhidrosis did not respond well to the Robinul. He has been experiencing excessive sweating in the armpits only. He has also tried numerous OTC deodorants.     Review Of Systems  Eyes: negative  Ears/Nose/Throat: negative  Respiratory: No shortness of breath, dyspnea on exertion, cough, or hemoptysis  Cardiovascular: negative  Gastrointestinal: negative  Genitourinary: negative  Musculoskeletal: negative  Neurologic: negative  Psychiatric: negative      PHYSICAL EXAM:    /78   Pulse 109   SpO2 100%   Skin exam performed as follows: Type 4 skin. Mood appropriate  Alert and Oriented X 3. Well developed, well nourished in no distress.  General appearance: Normal  Head including face: Normal  Eyes: conjunctiva and lids: Normal  Mouth: Lips, teeth, gums: Normal  Neck: Normal  Chest-breast/axillae: Normal  Back: Normal  Spleen and liver: Normal  Cardiovascular: Exam of peripheral vascular system by observation for swelling, varicosities, edema: Normal  Genitalia: groin, buttocks: Normal  Extremities: digits/nails (clubbing): Normal  Eccrine and Apocrine glands: Normal  Right upper extremity: Normal  Left upper extremity: Normal  Right lower extremity: Normal  Left lower extremity: Normal  Skin: Scalp and body hair: See below    1. Pustules in the scalp    ASSESSMENT/PLAN:     1. Folliculitis- on the scalp. Negative culture last year.   --Start ketoconazole shampoo daily  as needed  --Start doxycycline 20 mg BID. Advised to take with food. Discussed risk of GI upset, esophagitis and photosensitivity.   2. 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   --Start Drysol every day as tolerated  --Start oxybutynin every day-TID PRN        Follow-up: 2 months  CC:   Scribed By: Ananya Marcial, MS, PAPaulaC          Again, thank you for allowing me to participate in the care of your patient.        Sincerely,        Ananya Marcial PA-C

## 2020-11-17 NOTE — PROGRESS NOTES
HPI:   Chief complaints: Rian Pérez is a 24 year old male who presents for recheck of scalp folliculitis. He reports that the doxycycline worked very well, but the pimples returned when he stopped. The clindamycin was not covered by his insurance so he did not use this.     Additionally his hyperhidrosis did not respond well to the Robinul. He has been experiencing excessive sweating in the armpits only. He has also tried numerous OTC deodorants.     Review Of Systems  Eyes: negative  Ears/Nose/Throat: negative  Respiratory: No shortness of breath, dyspnea on exertion, cough, or hemoptysis  Cardiovascular: negative  Gastrointestinal: negative  Genitourinary: negative  Musculoskeletal: negative  Neurologic: negative  Psychiatric: negative      PHYSICAL EXAM:    /78   Pulse 109   SpO2 100%   Skin exam performed as follows: Type 4 skin. Mood appropriate  Alert and Oriented X 3. Well developed, well nourished in no distress.  General appearance: Normal  Head including face: Normal  Eyes: conjunctiva and lids: Normal  Mouth: Lips, teeth, gums: Normal  Neck: Normal  Chest-breast/axillae: Normal  Back: Normal  Spleen and liver: Normal  Cardiovascular: Exam of peripheral vascular system by observation for swelling, varicosities, edema: Normal  Genitalia: groin, buttocks: Normal  Extremities: digits/nails (clubbing): Normal  Eccrine and Apocrine glands: Normal  Right upper extremity: Normal  Left upper extremity: Normal  Right lower extremity: Normal  Left lower extremity: Normal  Skin: Scalp and body hair: See below    1. Pustules in the scalp    ASSESSMENT/PLAN:     1. Folliculitis- on the scalp. Negative culture last year.   --Start ketoconazole shampoo daily as needed  --Start doxycycline 20 mg BID. Advised to take with food. Discussed risk of GI upset, esophagitis and photosensitivity.   2. 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   --Start Drysol every day as  tolerated  --Start oxybutynin every day-TID PRN        Follow-up: 2 months  CC:   Scribed By: Ananya Marcial MS, PAJACOB

## 2020-12-27 ENCOUNTER — HEALTH MAINTENANCE LETTER (OUTPATIENT)
Age: 25
End: 2020-12-27

## 2021-04-16 ENCOUNTER — IMMUNIZATION (OUTPATIENT)
Dept: NURSING | Facility: CLINIC | Age: 26
End: 2021-04-16
Payer: COMMERCIAL

## 2021-04-16 PROCEDURE — 91300 PR COVID VAC PFIZER DIL RECON 30 MCG/0.3 ML IM: CPT

## 2021-04-16 PROCEDURE — 0001A PR COVID VAC PFIZER DIL RECON 30 MCG/0.3 ML IM: CPT

## 2021-04-24 ENCOUNTER — HEALTH MAINTENANCE LETTER (OUTPATIENT)
Age: 26
End: 2021-04-24

## 2021-05-07 ENCOUNTER — IMMUNIZATION (OUTPATIENT)
Dept: NURSING | Facility: CLINIC | Age: 26
End: 2021-05-07
Attending: NURSE PRACTITIONER
Payer: COMMERCIAL

## 2021-05-07 PROCEDURE — 0002A PR COVID VAC PFIZER DIL RECON 30 MCG/0.3 ML IM: CPT

## 2021-05-07 PROCEDURE — 91300 PR COVID VAC PFIZER DIL RECON 30 MCG/0.3 ML IM: CPT

## 2021-05-27 ENCOUNTER — OFFICE VISIT (OUTPATIENT)
Dept: INTERNAL MEDICINE | Facility: CLINIC | Age: 26
End: 2021-05-27
Payer: COMMERCIAL

## 2021-05-27 VITALS
RESPIRATION RATE: 16 BRPM | TEMPERATURE: 98.5 F | HEIGHT: 66 IN | HEART RATE: 77 BPM | DIASTOLIC BLOOD PRESSURE: 80 MMHG | OXYGEN SATURATION: 99 % | SYSTOLIC BLOOD PRESSURE: 120 MMHG | WEIGHT: 157 LBS | BODY MASS INDEX: 25.23 KG/M2

## 2021-05-27 DIAGNOSIS — B35.4 TINEA CIRCINATA: Primary | ICD-10-CM

## 2021-05-27 PROCEDURE — 99213 OFFICE O/P EST LOW 20 MIN: CPT | Performed by: INTERNAL MEDICINE

## 2021-05-27 RX ORDER — FLUCONAZOLE 100 MG/1
100 TABLET ORAL DAILY
Qty: 7 TABLET | Refills: 0 | Status: SHIPPED | OUTPATIENT
Start: 2021-05-27 | End: 2021-06-03

## 2021-05-27 RX ORDER — KETOCONAZOLE 20 MG/G
CREAM TOPICAL DAILY
Qty: 30 G | Refills: 0 | Status: SHIPPED | OUTPATIENT
Start: 2021-05-27 | End: 2021-07-15

## 2021-05-27 ASSESSMENT — MIFFLIN-ST. JEOR: SCORE: 1629.9

## 2021-05-27 NOTE — PATIENT INSTRUCTIONS
Fluconazole 100mg tablet, 1  Tab daily for 7 days to reduce fungal quantity on the skin  No alcohol while taking the fluconazole   Wash under the foreskin well daily   If skin issues return despite this, then may use Ketoconazole antifungal cream daily as needed for rash   If issue persists despite both of these, then call clinic and will  refer to  Urology to consider a circumcision to remove the foreskin

## 2021-05-27 NOTE — PROGRESS NOTES
ASSESSMENT:   1. Tinea circinata  Due to uncircumcised status  - fluconazole (DIFLUCAN) 100 MG tablet; Take 1 tablet (100 mg) by mouth daily for 7 days  Dispense: 7 tablet; Refill: 0  - ketoconazole (NIZORAL) 2 % external cream; Apply topically daily To affected skin rash until resolved  Dispense: 30 g; Refill: 0      PLAN:  Fluconazole 100mg tablet, 1  Tab daily for 7 days to reduce fungal quantity on the skin  No alcohol while taking the fluconazole   Wash under the foreskin well daily   If skin issues return despite this, then may use Ketoconazole antifungal cream daily as needed for rash   If issue persists despite both of these, then call clinic and will  refer to  Urology to consider a circumcision to remove the foreskin      (Chart documentation was completed, in part, with [employer] voice-recognition software. Even though reviewed, some grammatical, spelling, and word errors may remain.)    Carlos Maradiaga MD  Internal Medicine Department  [facility] JAMES Modi is a 25 year old who presents for the following health issues     HPI     Chief Complaint   Patient presents with     Penis/Scrotum Problem     Penial irritation with intercourse (E-Vist with Jesus 2x for condition)      Meeting patient for the first time today.  Patient is seen with his wife.  History of some irritation around the tip of his penis.  No dysuria or hematuria.  No hemospermia.  Patient is monogamous with his wife.  No history of STDs.  Patient is uncircumcised.  Irritation rash gets better with using some clotrimazole topical therapy but then comes back.  Wife denies any  discharge or other symptoms       Additional ROS:   Constitutional, HEENT, Cardiovascular, Pulmonary, GI and , Neuro, MSK and Psych review of systems/symptoms are otherwise negative or unchanged from previous, except as noted above.      OBJECTIVE:  /80   Pulse 77   Temp 98.5  F (36.9  C) (Temporal)   Resp 16   " Ht 1.676 m (5' 6\")   Wt 71.2 kg (157 lb)   SpO2 99%   BMI 25.34 kg/m     Estimated body mass index is 25.34 kg/m  as calculated from the following:    Height as of this encounter: 1.676 m (5' 6\").    Weight as of this encounter: 71.2 kg (157 lb).     Pulm: Lungs clear to auscultation   CV: Regular rates and rhythm  GI: Soft, nontender, Normal active bowel sounds, No hepatosplenomegaly or masses palpable   : Uncircumcised male.  No vesicles or other lesions seen on the penile shaft or scrotal area.  Mild erythema around the head of the penis under the foreskin that has the appearance of tinea.  No penile discharge.  No scrotal masses palpable.  No inguinal adenopathy              "

## 2021-07-12 ENCOUNTER — TELEPHONE (OUTPATIENT)
Dept: INTERNAL MEDICINE | Facility: CLINIC | Age: 26
End: 2021-07-12

## 2021-07-12 DIAGNOSIS — B35.4 TINEA CIRCINATA: ICD-10-CM

## 2021-07-12 DIAGNOSIS — Z41.2 ENCOUNTER FOR ROUTINE OR RITUAL CIRCUMCISION: Primary | ICD-10-CM

## 2021-07-12 NOTE — TELEPHONE ENCOUNTER
"Per office visit on 5/27, still having issues, needs referal to urology.     \"If issue persists despite both of these, then call clinic and will  refer to  Urology to consider a circumcision to remove the foreskin.\"    Pt phone [phone]  "

## 2021-07-14 RX ORDER — KETOCONAZOLE 20 MG/G
CREAM TOPICAL DAILY
Qty: 30 G | Refills: 0 | OUTPATIENT
Start: 2021-07-14

## 2021-07-15 RX ORDER — KETOCONAZOLE 20 MG/G
CREAM TOPICAL DAILY
Qty: 30 G | Refills: 0 | Status: SHIPPED | OUTPATIENT
Start: 2021-07-15 | End: 2022-01-28

## 2021-07-15 NOTE — TELEPHONE ENCOUNTER
RX sent over for pt this was local print before and referral was started but never done because was waiting for outcome of the medication /pills . Now wants referral so sent over .Desire Reese RN

## 2021-08-20 ENCOUNTER — OFFICE VISIT (OUTPATIENT)
Dept: DERMATOLOGY | Facility: CLINIC | Age: 26
End: 2021-08-20
Payer: COMMERCIAL

## 2021-08-20 VITALS — HEART RATE: 66 BPM | SYSTOLIC BLOOD PRESSURE: 111 MMHG | DIASTOLIC BLOOD PRESSURE: 68 MMHG | OXYGEN SATURATION: 99 %

## 2021-08-20 DIAGNOSIS — L73.9 FOLLICULITIS: Primary | ICD-10-CM

## 2021-08-20 PROCEDURE — 99213 OFFICE O/P EST LOW 20 MIN: CPT | Performed by: PHYSICIAN ASSISTANT

## 2021-08-20 RX ORDER — DOXYCYCLINE 100 MG/1
CAPSULE ORAL
Qty: 60 CAPSULE | Refills: 2 | Status: SHIPPED | OUTPATIENT
Start: 2021-08-20 | End: 2022-01-28

## 2021-08-20 RX ORDER — CLINDAMYCIN PHOSPHATE 11.9 MG/ML
SOLUTION TOPICAL
Qty: 60 ML | Refills: 1 | Status: SHIPPED | OUTPATIENT
Start: 2021-08-20 | End: 2022-01-28

## 2021-08-20 NOTE — LETTER
8/20/2021         RE: Rian Pérez  [address]  [facility] [address]        Dear Colleague,    Thank you for referring your patient, Rian Pérez, to the [facility]. Please see a copy of my visit note below.    HPI:   Chief complaints: Rian Pérez is a 25 year old male who presents for recheck of scalp folliculitis. The low dose doxycycline did not help.          PHYSICAL EXAM:    /68   Pulse 66   SpO2 99%   Skin exam performed as follows: Type 4 skin. Mood appropriate  Alert and Oriented X 3. Well developed, well nourished in no distress.  General appearance: Normal  Head including face: Normal  Eyes: conjunctiva and lids: Normal  Mouth: Lips, teeth, gums: Normal  Neck: Normal  Chest-breast/axillae: Normal  Back: Normal  Spleen and liver: Normal  Cardiovascular: Exam of peripheral vascular system by observation for swelling, varicosities, edema: Normal  Genitalia: groin, buttocks: Normal  Extremities: digits/nails (clubbing): Normal  Eccrine and Apocrine glands: Normal  Right upper extremity: Normal  Left upper extremity: Normal  Right lower extremity: Normal  Left lower extremity: Normal  Skin: Scalp and body hair: See below    1. Pustules in the scalp    ASSESSMENT/PLAN:     1. Folliculitis- on the scalp. Negative culture last year.   --Start doxycycline 100 mg BID x 1 months then try to take a break. Can do 1 month at a time. Advised to take with food. Discussed risk of GI upset, esophagitis and photosensitivity.   --Start clindamycin solution (goodrx coupon) - mix this 1:1 with BPO gel BID PRN         Follow-up: yearly if doing well/PRN soone  CC:   Scribed By: Ananya Marcial, MS, PAJACOB          Again, thank you for allowing me to participate in the care of your patient.        Sincerely,        Ananya Marcial PA-C

## 2021-08-20 NOTE — PATIENT INSTRUCTIONS
Restart doxycycline for 1 month then try to stop. If you break out again, you can repeat for 1 month at a time.     Apply clindamycin solution to the active pimples twice per day as needed. Mix this 1:1 with an OTC benzoyl peroxide gel in your hand.

## 2021-08-20 NOTE — PROGRESS NOTES
HPI:   Chief complaints: Rian éPrez is a 25 year old male who presents for recheck of scalp folliculitis. The low dose doxycycline did not help.          PHYSICAL EXAM:    /68   Pulse 66   SpO2 99%   Skin exam performed as follows: Type 4 skin. Mood appropriate  Alert and Oriented X 3. Well developed, well nourished in no distress.  General appearance: Normal  Head including face: Normal  Eyes: conjunctiva and lids: Normal  Mouth: Lips, teeth, gums: Normal  Neck: Normal  Chest-breast/axillae: Normal  Back: Normal  Spleen and liver: Normal  Cardiovascular: Exam of peripheral vascular system by observation for swelling, varicosities, edema: Normal  Genitalia: groin, buttocks: Normal  Extremities: digits/nails (clubbing): Normal  Eccrine and Apocrine glands: Normal  Right upper extremity: Normal  Left upper extremity: Normal  Right lower extremity: Normal  Left lower extremity: Normal  Skin: Scalp and body hair: See below    1. Pustules in the scalp    ASSESSMENT/PLAN:     1. Folliculitis- on the scalp. Negative culture last year.   --Start doxycycline 100 mg BID x 1 months then try to take a break. Can do 1 month at a time. Advised to take with food. Discussed risk of GI upset, esophagitis and photosensitivity.   --Start clindamycin solution (goodrx coupon) - mix this 1:1 with BPO gel BID PRN         Follow-up: yearly if doing well/PRN soone  CC:   Scribed By: Ananya Marcial, MS, PA-C

## 2021-08-30 ENCOUNTER — OFFICE VISIT (OUTPATIENT)
Dept: UROLOGY | Facility: CLINIC | Age: 26
End: 2021-08-30
Payer: COMMERCIAL

## 2021-08-30 DIAGNOSIS — N48.89 PENILE IRRITATION: ICD-10-CM

## 2021-08-30 DIAGNOSIS — Z78.9 UNCIRCUMCISED MALE: Primary | ICD-10-CM

## 2021-08-30 PROCEDURE — 99203 OFFICE O/P NEW LOW 30 MIN: CPT | Performed by: STUDENT IN AN ORGANIZED HEALTH CARE EDUCATION/TRAINING PROGRAM

## 2021-08-30 NOTE — PATIENT INSTRUCTIONS
Keep penis clean and dry    Use warm water, try to avoid excessive soap    Try to express any waxy substance which builds up in the gland under the coronal sulcus    See me back if having further issues or desire circumcision    Patient Education     Adult Circumcision   Circumcision is a procedure to remove the foreskin, the loose fold of skin that covers the head of the penis. You may have a condition that requires circumcision. Or you may want to be circumcised for personal reasons. Either way, you will want to know what to expect. Read on to learn more about adult circumcision and how it s done.     Before the procedure  Tell your healthcare provider about all medicines you take and any allergies you have. Cream to numb the skin of the penis may be applied 30 to 60 minutes before the procedure. There will be some swelling and soreness after the procedure, so arrange for an adult family member or friend to drive you home.   During the procedure    The penis and nearby area are cleaned and prepared for the procedure.    An IV (intravenous) line is placed in your hand or arm. It supplies fluids and medicine. This may include medicine (anesthesia) to prevent pain. Depending on what type of anesthesia you get, you may be awake, drowsy, or asleep during the procedure. The skin of the penis may also be numbed with injections of local anesthesia.    Once the penis is numb or you are drowsy or asleep, cuts (incisions) are made in the foreskin. The foreskin is then removed.    The incisions are closed with stitches or surgical glue.    The incision is covered with ointment. A bandage is put on the penis.    After the procedure  You will be taken to a recovery area where you ll recover from the anesthesia. Nurses will check on you as you rest. They can also give you pain medicine if needed. Your healthcare provider will tell you when it s OK for you to go home. This will be the same day. When you dress to go home, wear  snug-fitting, brief-style underwear. This will help hold your bandage in place. You will also be given care instructions for when you return home.   What to expect    You will likely see a crust of blood or yellowish coating around the head of the penis. Don't remove scabs. It s OK if they fall off on their own.    The penis will swell. It may bleed a little around the incision.    The head of the penis will be red or black-and-blue.    You may have pain with urination for the first few days.    Take pain medicine as instructed by your healthcare provider.    Healing takes about  2 weeks. The stitches should dissolve on their own.    Caring for your penis    You may shower  24 hours after surgery. When drying off, gently pat the penis dry.    Don t take a bath or use a hot tub, Jacuzzi, or swimming pool for  2 weeks after surgery.    Follow your healthcare provider s instructions for bandage care. Change or remove the bandage only when told to do so by your provider. This will likely be the day after surgery.    Don't have any kind of sexual activity for  4 to 6  weeks after surgery. An erection can cause the incisions to open. Ask your healthcare provider what you can do to help stop erections.    Follow-up care  Make a follow-up appointment with your healthcare provider, or as advised.   When to call your healthcare provider  Call the healthcare provider right away if you have any of the following:     Fever of 100.4  F ( 38 C ) or higher, or as directed by your provider    More redness, bruising, or swelling of the penis    Discharge that is heavy, a greenish color, or lasts more than a week    Bleeding that isn t controlled by applying gentle pressure    Inability to urinate  Judith last reviewed this educational content on 10/1/2019    4502-9348 [employer]. All rights reserved. This information is not intended as a substitute for professional medical care. Always follow your healthcare  professional's instructions.

## 2021-08-30 NOTE — LETTER
"8/30/2021       RE: Rian Pérez  [address] [facility] [address]     Dear Colleague,    Thank you for referring your patient, Rian Pérez, to the [facility] [place] at [facility]. Please see a copy of my visit note below.    [facility]  Main Office: [address]  [address]  [place] [address]       CHIEF COMPLAINT:  uncircimcised male with penile irritation    Presents with wife.    HISTORY:   24 yo uncircumcised male with penile irritation    Has had problems with \"cracking\" of the skin of the penis underneath the glans as well as redness, this occurs intermittently and seems to be associated with sexual intercourse. He has tried oral medication (abx) as well as topical creams without much effect    Currently the affected area is more normal    Never had any surgery.          PAST MEDICAL HISTORY: History reviewed. No pertinent past medical history.    PAST SURGICAL HISTORY: History reviewed. No pertinent surgical history.    FAMILY HISTORY:   Family History   Problem Relation Age of Onset     GERD Father        SOCIAL HISTORY:   Social History     Tobacco Use     Smoking status: Former Smoker     Packs/day: 0.00     Smokeless tobacco: Never Used   Substance Use Topics     Alcohol use: No        No Known Allergies      Current Outpatient Medications:      clindamycin (CLEOCIN T) 1 % external solution, To the scalp, Disp: 60 mL, Rfl: 1     doxycycline monohydrate (MONODOX) 100 MG capsule, 1 cap PO BID with food and full glass of water, Disp: 60 capsule, Rfl: 2     aluminum chloride (DRYSOL) 20 % external solution, Apply to clean dry skin at HS (Patient not taking: Reported on 8/20/2021), Disp: 60 mL, Rfl: 11     ketoconazole (NIZORAL) 2 % external cream, Apply topically daily To affected skin rash until resolved (Patient not taking: Reported on 8/20/2021), Disp: 30 g, Rfl: 0     ketoconazole (NIZORAL) 2 % external shampoo, Use " daily as needed (Patient not taking: Reported on 8/20/2021), Disp: 120 mL, Rfl: 11     oxybutynin (DITROPAN) 5 MG tablet, Take 1 tablet (5 mg) by mouth 3 times daily 1-3 tablets PO daily (Patient not taking: Reported on 8/20/2021), Disp: 90 tablet, Rfl: 3    Review Of Systems:  Skin: No rash, pruritis, or skin pigmentation  Eyes: No changes in vision  Ears/Nose/Throat: No changes in hearing, no nosebleeds  Respiratory: No shortness of breath, dyspnea on exertion, cough, or hemoptysis  Cardiovascular: No chest pain or palpitations  Gastrointestinal: No diarrhea or constipation. No abdominal pain. No hematochezia  Genitourinary: see HPI  Musculoskeletal: No pain or swelling of joints, normal range of motion  Neurologic: No weakness or tremors  Psychiatric: No recent changes in memory or mood  Hematologic/Lymphatic/Immunologic: No easy bruising or enlarged lymph nodes  Endocrine: No weight gain or loss      PHYSICAL EXAM:    There were no vitals taken for this visit.  General appearance: In NAD, conversant  HEENT: Normocephalic and atraumatic, anicteric sclera  Cardiovascular: Not examined  Respiratory: normal, non-labored breathing  Gastrointestinal: negative, Abdomen soft, non-tender, and non-distended.   Musculoskeletal: Not Examined  Peripheral Vascular/extremity: No peripheral edema  Skin: Normal temperature, turgor, and texture. No rash  Psychiatric: Appropriate affect, alert and oriented to person, place, and time    Penis: uncirc, no phimosis, easily retract foreskin to reveal normal glans with orthotopic and patent meatus. There is a prominent sebaceous gland to the left of the frenulum which today is filled with a waxy substance, I manually expressed it and the gland is now empty  Scrotal Skin: normal  Testicles: Normal bilat  Digital Rectal Exam: not examined    Cystoscopy: Not done      PSA: n/a    UA RESULTS:  Recent Labs   Lab Test 10/28/19  1553   COLOR Yellow   APPEARANCE Clear   URINEGLC Negative    URINEBILI Negative   URINEKETONE Negative   SG 1.020   UBLD Negative   URINEPH 7.0   PROTEIN Negative   UROBILINOGEN 1.0   NITRITE Negative   LEUKEST Negative   RBCU O - 2   WBCU 0 - 5       Bladder Scan:     Other Labs:      Imaging Studies: None      CLINICAL IMPRESSION:   24 yo uncircumcised male with penile irritation. I discussed with the patient that likely his penile irritation is from a large prominent sebaceous gland just to the left of the frenulum which intermittently becomes blocked with sebum. I expressed a plug of sebum today leaving behind a widely patent pore and demonstrated this to the patient. I discussed that he does not have phimosis and that a normal circumcision which preserved a mucosal collar would not usually also remove that area which contains the sebaceous gland. I recommended three options    1) observation, with good hygiene, warm water baths, expressing sebum from the sebaceous gland prn, returning if having ongoing issues  2) circumcision leaving behind a mucosal collar as well as the sebaceous gland next to the frenulum, may not solve the issue of the sebaceous gland in question is the culprit  3) circumcision removing the frenulum and sebaceous gland, may appear less cosmetic    For now, patient opts to observe. He will contact the office if he has further issues or wants a circumcision    PLAN:   Keep penis clean and dry    Use warm water, try to avoid excessive soap    Try to express any waxy substance which builds up in the gland under the coronal sulcus    See me back if having further issues or desire circumcision    Shmuel Harden MD   [facility]  [phone] clinic phone

## 2021-08-30 NOTE — PROGRESS NOTES
"[facility]  Main Office: [address]  [address]  [place] [address]       CHIEF COMPLAINT:  uncircimcised male with penile irritation    Presents with wife.    HISTORY:   26 yo uncircumcised male with penile irritation    Has had problems with \"cracking\" of the skin of the penis underneath the glans as well as redness, this occurs intermittently and seems to be associated with sexual intercourse. He has tried oral medication (abx) as well as topical creams without much effect    Currently the affected area is more normal    Never had any surgery.          PAST MEDICAL HISTORY: History reviewed. No pertinent past medical history.    PAST SURGICAL HISTORY: History reviewed. No pertinent surgical history.    FAMILY HISTORY:   Family History   Problem Relation Age of Onset     GERD Father        SOCIAL HISTORY:   Social History     Tobacco Use     Smoking status: Former Smoker     Packs/day: 0.00     Smokeless tobacco: Never Used   Substance Use Topics     Alcohol use: No        No Known Allergies      Current Outpatient Medications:      clindamycin (CLEOCIN T) 1 % external solution, To the scalp, Disp: 60 mL, Rfl: 1     doxycycline monohydrate (MONODOX) 100 MG capsule, 1 cap PO BID with food and full glass of water, Disp: 60 capsule, Rfl: 2     aluminum chloride (DRYSOL) 20 % external solution, Apply to clean dry skin at HS (Patient not taking: Reported on 8/20/2021), Disp: 60 mL, Rfl: 11     ketoconazole (NIZORAL) 2 % external cream, Apply topically daily To affected skin rash until resolved (Patient not taking: Reported on 8/20/2021), Disp: 30 g, Rfl: 0     ketoconazole (NIZORAL) 2 % external shampoo, Use daily as needed (Patient not taking: Reported on 8/20/2021), Disp: 120 mL, Rfl: 11     oxybutynin (DITROPAN) 5 MG tablet, Take 1 tablet (5 mg) by mouth 3 times daily 1-3 tablets PO daily (Patient not taking: Reported on 8/20/2021), Disp: 90 tablet, Rfl: 3    Review Of Systems:  Skin: No rash, " pruritis, or skin pigmentation  Eyes: No changes in vision  Ears/Nose/Throat: No changes in hearing, no nosebleeds  Respiratory: No shortness of breath, dyspnea on exertion, cough, or hemoptysis  Cardiovascular: No chest pain or palpitations  Gastrointestinal: No diarrhea or constipation. No abdominal pain. No hematochezia  Genitourinary: see HPI  Musculoskeletal: No pain or swelling of joints, normal range of motion  Neurologic: No weakness or tremors  Psychiatric: No recent changes in memory or mood  Hematologic/Lymphatic/Immunologic: No easy bruising or enlarged lymph nodes  Endocrine: No weight gain or loss      PHYSICAL EXAM:    There were no vitals taken for this visit.  General appearance: In NAD, conversant  HEENT: Normocephalic and atraumatic, anicteric sclera  Cardiovascular: Not examined  Respiratory: normal, non-labored breathing  Gastrointestinal: negative, Abdomen soft, non-tender, and non-distended.   Musculoskeletal: Not Examined  Peripheral Vascular/extremity: No peripheral edema  Skin: Normal temperature, turgor, and texture. No rash  Psychiatric: Appropriate affect, alert and oriented to person, place, and time    Penis: uncirc, no phimosis, easily retract foreskin to reveal normal glans with orthotopic and patent meatus. There is a prominent sebaceous gland to the left of the frenulum which today is filled with a waxy substance, I manually expressed it and the gland is now empty  Scrotal Skin: normal  Testicles: Normal bilat  Digital Rectal Exam: not examined    Cystoscopy: Not done      PSA: n/a    UA RESULTS:  Recent Labs   Lab Test 10/28/19  1553   COLOR Yellow   APPEARANCE Clear   URINEGLC Negative   URINEBILI Negative   URINEKETONE Negative   SG 1.020   UBLD Negative   URINEPH 7.0   PROTEIN Negative   UROBILINOGEN 1.0   NITRITE Negative   LEUKEST Negative   RBCU O - 2   WBCU 0 - 5       Bladder Scan:     Other Labs:      Imaging Studies: None      CLINICAL IMPRESSION:   26 yo uncircumcised  male with penile irritation. I discussed with the patient that likely his penile irritation is from a large prominent sebaceous gland just to the left of the frenulum which intermittently becomes blocked with sebum. I expressed a plug of sebum today leaving behind a widely patent pore and demonstrated this to the patient. I discussed that he does not have phimosis and that a normal circumcision which preserved a mucosal collar would not usually also remove that area which contains the sebaceous gland. I recommended three options    1) observation, with good hygiene, warm water baths, expressing sebum from the sebaceous gland prn, returning if having ongoing issues  2) circumcision leaving behind a mucosal collar as well as the sebaceous gland next to the frenulum, may not solve the issue of the sebaceous gland in question is the culprit  3) circumcision removing the frenulum and sebaceous gland, may appear less cosmetic    For now, patient opts to observe. He will contact the office if he has further issues or wants a circumcision    PLAN:   Keep penis clean and dry    Use warm water, try to avoid excessive soap    Try to express any waxy substance which builds up in the gland under the coronal sulcus    See me back if having further issues or desire circumcision    Shmuel Harden MD   [facility]  [phone] clinic phone

## 2021-09-26 ENCOUNTER — OFFICE VISIT (OUTPATIENT)
Dept: URGENT CARE | Facility: URGENT CARE | Age: 26
End: 2021-09-26
Payer: COMMERCIAL

## 2021-09-26 ENCOUNTER — ANCILLARY PROCEDURE (OUTPATIENT)
Dept: GENERAL RADIOLOGY | Facility: CLINIC | Age: 26
End: 2021-09-26
Attending: PHYSICIAN ASSISTANT
Payer: COMMERCIAL

## 2021-09-26 VITALS
DIASTOLIC BLOOD PRESSURE: 74 MMHG | TEMPERATURE: 98 F | BODY MASS INDEX: 25.66 KG/M2 | WEIGHT: 159 LBS | HEART RATE: 76 BPM | SYSTOLIC BLOOD PRESSURE: 133 MMHG

## 2021-09-26 DIAGNOSIS — M54.50 ACUTE BILATERAL LOW BACK PAIN WITHOUT SCIATICA: ICD-10-CM

## 2021-09-26 DIAGNOSIS — R10.9 ABDOMINAL PAIN, UNSPECIFIED ABDOMINAL LOCATION: ICD-10-CM

## 2021-09-26 DIAGNOSIS — K59.00 CONSTIPATION, UNSPECIFIED CONSTIPATION TYPE: Primary | ICD-10-CM

## 2021-09-26 LAB
ALBUMIN UR-MCNC: NEGATIVE MG/DL
APPEARANCE UR: CLEAR
BASOPHILS # BLD AUTO: 0 10E3/UL (ref 0–0.2)
BASOPHILS NFR BLD AUTO: 0 %
BILIRUB UR QL STRIP: NEGATIVE
COLOR UR AUTO: YELLOW
EOSINOPHIL # BLD AUTO: 0.1 10E3/UL (ref 0–0.7)
EOSINOPHIL NFR BLD AUTO: 2 %
ERYTHROCYTE [DISTWIDTH] IN BLOOD BY AUTOMATED COUNT: 12.3 % (ref 10–15)
GLUCOSE UR STRIP-MCNC: NEGATIVE MG/DL
HCT VFR BLD AUTO: 48.4 % (ref 40–53)
HGB BLD-MCNC: 16.4 G/DL (ref 13.3–17.7)
HGB UR QL STRIP: NEGATIVE
KETONES UR STRIP-MCNC: NEGATIVE MG/DL
LEUKOCYTE ESTERASE UR QL STRIP: NEGATIVE
LYMPHOCYTES # BLD AUTO: 1.9 10E3/UL (ref 0.8–5.3)
LYMPHOCYTES NFR BLD AUTO: 38 %
MCH RBC QN AUTO: 30.3 PG (ref 26.5–33)
MCHC RBC AUTO-ENTMCNC: 33.9 G/DL (ref 31.5–36.5)
MCV RBC AUTO: 90 FL (ref 78–100)
MONOCYTES # BLD AUTO: 0.7 10E3/UL (ref 0–1.3)
MONOCYTES NFR BLD AUTO: 14 %
NEUTROPHILS # BLD AUTO: 2.3 10E3/UL (ref 1.6–8.3)
NEUTROPHILS NFR BLD AUTO: 47 %
NITRATE UR QL: NEGATIVE
PH UR STRIP: 7.5 [PH] (ref 5–7)
PLATELET # BLD AUTO: 219 10E3/UL (ref 150–450)
RBC # BLD AUTO: 5.41 10E6/UL (ref 4.4–5.9)
SP GR UR STRIP: 1.02 (ref 1–1.03)
UROBILINOGEN UR STRIP-ACNC: 0.2 E.U./DL
WBC # BLD AUTO: 5 10E3/UL (ref 4–11)

## 2021-09-26 PROCEDURE — 80048 BASIC METABOLIC PNL TOTAL CA: CPT | Performed by: PHYSICIAN ASSISTANT

## 2021-09-26 PROCEDURE — 81003 URINALYSIS AUTO W/O SCOPE: CPT

## 2021-09-26 PROCEDURE — 74019 RADEX ABDOMEN 2 VIEWS: CPT | Performed by: RADIOLOGY

## 2021-09-26 PROCEDURE — 36415 COLL VENOUS BLD VENIPUNCTURE: CPT | Performed by: PHYSICIAN ASSISTANT

## 2021-09-26 PROCEDURE — 85025 COMPLETE CBC W/AUTO DIFF WBC: CPT | Performed by: PHYSICIAN ASSISTANT

## 2021-09-26 PROCEDURE — 99214 OFFICE O/P EST MOD 30 MIN: CPT | Performed by: PHYSICIAN ASSISTANT

## 2021-09-26 RX ORDER — POLYETHYLENE GLYCOL 3350 17 G/17G
1 POWDER, FOR SOLUTION ORAL DAILY
Qty: 578 G | Refills: 0 | Status: SHIPPED | OUTPATIENT
Start: 2021-09-26 | End: 2022-05-31

## 2021-09-26 NOTE — PROGRESS NOTES
Patient presents with:  Urgent Care: no bowel movement/constipation, abdominal pain and left side back pain since last Saturday.      (K59.00) Constipation, unspecified constipation type  (primary encounter diagnosis)  Comment:   Plan: polyethylene glycol (MIRALAX) 17 GM/Dose powder        See handout on constipation    (R10.9) Abdominal pain, unspecified abdominal location  Comment:   Plan: UA reflex to Microscopic and Culture, XR         Abdomen 2 Views, Basic metabolic panel  (Ca,         Cl, CO2, Creat, Gluc, K, Na, BUN), CBC with         platelets and differential            (M54.5) Left low back pain  Comment:   Plan: UA reflex to Microscopic and Culture          STOP dulcolax.     Increase water intake.  Start Miralax.        SUBJECTIVE:   Rian Pérez is a 26 year old male who presents today with abdominal discomfort for the past week.  Last significant BM was a week ago, has had some small firm bowel movements.  Denies any diarrhea or vomiting.      Took dulcolax twice, complains of stomach cramps.     Denies any fevers.   Denies any URI symptoms.      SH: here with his girlfriend, not ill.      Current Outpatient Medications   Medication Sig Dispense Refill     Multiple Vitamins-Iron (DAILY-JANETTE/IRON/BETA-CAROTENE) TABS TAKE 1 TABLET BY MOUTH DAILY. (Patient not taking: Reported on 10/19/2020) 30 tablet 7     Social History     Tobacco Use     Smoking status: Never Smoker     Smokeless tobacco: Never Used   Substance Use Topics     Alcohol use: Not on file     Family History   Problem Relation Age of Onset     Diabetes Mother      Diabetes Father          ROS:    10 point ROS of systems including Constitutional, Eyes, Respiratory, Cardiovascular, Gastroenterology, Genitourinary, Integumentary, Muscularskeletal, Psychiatric ,neurological were all negative except for pertinent positives noted in my HPI       OBJECTIVE:  /74   Pulse 76   Temp 98  F (36.7  C) (Tympanic)   Wt 72.1 kg (159 lb)   BMI  25.66 kg/m    Physical Exam:  GENERAL APPEARANCE: healthy, alert and no distress  EYES: EOMI,  PERRL, conjunctiva clear  HENT: ear canals and TM's normal.  Nose and mouth without ulcers, erythema or lesions  NECK: supple, nontender, no lymphadenopathy  RESP: lungs clear to auscultation - no rales, rhonchi or wheezes  CV: regular rates and rhythm, normal S1 S2, no murmur noted  ABDOMEN:  soft, nontender, no HSM or masses and bowel sounds normal  NEURO: Normal strength and tone, sensory exam grossly normal,  normal speech and mentation  SKIN: no suspicious lesions or rashes    X-Ray was done, my findings are: no evidence of bowel obstruction, however + stool and bowel gas

## 2021-09-26 NOTE — PATIENT INSTRUCTIONS
Patient Education     Coping with Constipation  What is constipation?  If you have hard, dry stools that are difficult to pass, you have constipation. You may also have bloating, gas and stomach cramps.  How is it treated?  If the problem is severe, your care team can suggest medicine to relieve it (a laxative, stool softener or enema). Do not use medicine unless your care team tells you to.  You should not use an enema (rectal wash) if your white blood cell or platelet count is low.  What else can I do to treat or prevent constipation?    Eat at the same times each day.    Add fiber to your diet by eating:  ? Whole grain breads, cereals and pastas  ? Whole grains such as barley or brown rice  ? Raw vegetables  ? Fresh and dried fruits  ? Dried beans and peas  ? Nuts, seeds and popcorn.    Drink lots of fluids: at least eight to ten 8-ounce glasses each day. Try water, prune juice, warm juices, herbal teas and lemonade.    Have a hot drink with high-fiber foods for breakfast.    Eat the skins on fruits and potatoes. Wash them well before eating.    Add wheat bran to cereals, casseroles and homemade breads.    If gas is a problem:  ? Avoid gas-forming foods such carbonated (fizzy) drinks, broccoli, cabbage, cauliflower, dried beans and peas, peppers and onions.  ? Do not use a straw.  ? Do not chew gum.    Stay active. Simply getting out for a walk can help. Increase your exercise as you are able.    Try to use the toilet at the same times each day. Keep a record of your bowel movements.    If you take medicine that may cause constipation, your doctor may ask you to take a stool softener.  When should I call my care team?  Call your care team if:    You do not have a bowel movement for two or more days.    You have sudden, severe belly pain.    You notice blood in your stool.    You have severe hemorrhoids (swelling, itching and pain around the  anus).  Comments:  __________________________________________  __________________________________________  __________________________________________  __________________________________________  __________________________________________  __________________________________________  __________________________________________  __________________________________________  __________________________________________  __________________________________________  For informational purposes only. Not to replace the advice of your health care provider. Copyright   2006 [facility]. All rights reserved. Clinically reviewed by [facility]. [employer] 895195 - REV 04/19.

## 2021-10-06 LAB
ANION GAP SERPL CALCULATED.3IONS-SCNC: 2 MMOL/L (ref 3–14)
BUN SERPL-MCNC: 11 MG/DL (ref 7–30)
CALCIUM SERPL-MCNC: 9.6 MG/DL (ref 8.5–10.1)
CHLORIDE BLD-SCNC: 105 MMOL/L (ref 94–109)
CO2 SERPL-SCNC: 29 MMOL/L (ref 20–32)
CREAT SERPL-MCNC: 0.84 MG/DL (ref 0.66–1.25)
GFR SERPL CREATININE-BSD FRML MDRD: >90 ML/MIN/1.73M2
GLUCOSE BLD-MCNC: 85 MG/DL (ref 70–99)
POTASSIUM BLD-SCNC: 4.1 MMOL/L (ref 3.4–5.3)
SODIUM SERPL-SCNC: 136 MMOL/L (ref 133–144)

## 2021-10-09 ENCOUNTER — HEALTH MAINTENANCE LETTER (OUTPATIENT)
Age: 26
End: 2021-10-09

## 2022-01-28 ENCOUNTER — OFFICE VISIT (OUTPATIENT)
Dept: INTERNAL MEDICINE | Facility: CLINIC | Age: 27
End: 2022-01-28
Payer: COMMERCIAL

## 2022-01-28 VITALS
HEART RATE: 75 BPM | OXYGEN SATURATION: 98 % | TEMPERATURE: 98 F | RESPIRATION RATE: 16 BRPM | WEIGHT: 162 LBS | SYSTOLIC BLOOD PRESSURE: 126 MMHG | DIASTOLIC BLOOD PRESSURE: 80 MMHG | BODY MASS INDEX: 26.15 KG/M2

## 2022-01-28 DIAGNOSIS — R19.8 ALTERED BOWEL FUNCTION: ICD-10-CM

## 2022-01-28 DIAGNOSIS — K59.00 CONSTIPATION, UNSPECIFIED CONSTIPATION TYPE: Primary | ICD-10-CM

## 2022-01-28 LAB
ALBUMIN SERPL-MCNC: 4.6 G/DL (ref 3.4–5)
ALP SERPL-CCNC: 70 U/L (ref 40–150)
ALT SERPL W P-5'-P-CCNC: 38 U/L (ref 0–70)
ANION GAP SERPL CALCULATED.3IONS-SCNC: 3 MMOL/L (ref 3–14)
AST SERPL W P-5'-P-CCNC: 27 U/L (ref 0–45)
BILIRUB SERPL-MCNC: 0.6 MG/DL (ref 0.2–1.3)
BUN SERPL-MCNC: 12 MG/DL (ref 7–30)
CALCIUM SERPL-MCNC: 9.6 MG/DL (ref 8.5–10.1)
CHLORIDE BLD-SCNC: 105 MMOL/L (ref 94–109)
CO2 SERPL-SCNC: 28 MMOL/L (ref 20–32)
CREAT SERPL-MCNC: 0.83 MG/DL (ref 0.66–1.25)
GFR SERPL CREATININE-BSD FRML MDRD: >90 ML/MIN/1.73M2
GLUCOSE BLD-MCNC: 94 MG/DL (ref 70–99)
POTASSIUM BLD-SCNC: 5 MMOL/L (ref 3.4–5.3)
PROT SERPL-MCNC: 8.4 G/DL (ref 6.8–8.8)
SODIUM SERPL-SCNC: 136 MMOL/L (ref 133–144)
TSH SERPL DL<=0.005 MIU/L-ACNC: 0.79 MU/L (ref 0.4–4)

## 2022-01-28 PROCEDURE — 80053 COMPREHEN METABOLIC PANEL: CPT | Performed by: PHYSICIAN ASSISTANT

## 2022-01-28 PROCEDURE — 84443 ASSAY THYROID STIM HORMONE: CPT | Performed by: PHYSICIAN ASSISTANT

## 2022-01-28 PROCEDURE — 36415 COLL VENOUS BLD VENIPUNCTURE: CPT | Performed by: PHYSICIAN ASSISTANT

## 2022-01-28 PROCEDURE — 99213 OFFICE O/P EST LOW 20 MIN: CPT | Performed by: PHYSICIAN ASSISTANT

## 2022-01-28 RX ORDER — DICYCLOMINE HYDROCHLORIDE 10 MG/1
10 CAPSULE ORAL 4 TIMES DAILY PRN
Qty: 30 CAPSULE | Refills: 0 | Status: SHIPPED | OUTPATIENT
Start: 2022-01-28 | End: 2022-04-05

## 2022-01-28 NOTE — PROGRESS NOTES
"  Assessment & Plan     Constipation, unspecified constipation type      - Comprehensive metabolic panel (BMP + Alb, Alk Phos, ALT, AST, Total. Bili, TP)  - TSH with free T4 reflex    Altered bowel function  Over the past few months   Recheck labs   Trial of med to see if this helps with colon urge to defecate  See GI  - dicyclomine (BENTYL) 10 MG capsule; Take 1 capsule (10 mg) by mouth 4 times daily as needed  - Adult Gastro Ref - Consult Only; Future  - Comprehensive metabolic panel (BMP + Alb, Alk Phos, ALT, AST, Total. Bili, TP)  - TSH with free T4 reflex             BMI:   Estimated body mass index is 26.15 kg/m  as calculated from the following:    Height as of 5/27/21: 1.676 m (5' 6\").    Weight as of this encounter: 73.5 kg (162 lb).       Reviewed with patient- having daily BM- soft. No hard stools  Feels the urge to have a BM but then is not able to     Return in about 4 weeks (around 2/25/2022) for see gastroenterology .    Tri Gregory PA-C  [facility] JAMES Modi is a 26 year old who presents for the following health issues     HPI     Patient was seen in UC for abdominal pain and has been still having the same issues. It never got better even with taking the mirilax for a month. Has been getting worst.   Patient with normal stool - soft. Usually having once a day but then can have the urge to have another BM and no results.   No abdominal pain or cramping.         Review of Systems   Constitutional, HEENT, cardiovascular, pulmonary, gi and gu systems are negative, except as otherwise noted.      Objective    /80   Pulse 75   Temp 98  F (36.7  C) (Tympanic)   Resp 16   Wt 73.5 kg (162 lb)   SpO2 98%   BMI 26.15 kg/m    Body mass index is 26.15 kg/m .  Physical Exam   GENERAL: healthy, alert and no distress  RESP: lungs clear to auscultation - no rales, rhonchi or wheezes  CV: regular rates and rhythm and normal S1 S2, no S3 or S4  SKIN: " no suspicious lesions or rashes

## 2022-02-11 ENCOUNTER — HOSPITAL ENCOUNTER (OUTPATIENT)
Dept: GENERAL RADIOLOGY | Facility: CLINIC | Age: 27
Discharge: HOME | End: 2022-02-11
Attending: INTERNAL MEDICINE | Admitting: INTERNAL MEDICINE
Payer: COMMERCIAL

## 2022-02-11 DIAGNOSIS — R19.4 CHANGE IN BOWEL HABITS: ICD-10-CM

## 2022-02-11 DIAGNOSIS — K59.00 CONSTIPATION, UNSPECIFIED CONSTIPATION TYPE: ICD-10-CM

## 2022-02-11 PROCEDURE — 74019 RADEX ABDOMEN 2 VIEWS: CPT

## 2022-03-29 ENCOUNTER — TELEPHONE (OUTPATIENT)
Dept: INTERNAL MEDICINE | Facility: CLINIC | Age: 27
End: 2022-03-29
Payer: COMMERCIAL

## 2022-03-29 DIAGNOSIS — B35.4 TINEA CIRCINATA: Primary | ICD-10-CM

## 2022-03-29 RX ORDER — KETOCONAZOLE 20 MG/G
CREAM TOPICAL DAILY
Qty: 30 G | Refills: 1 | Status: SHIPPED | OUTPATIENT
Start: 2022-03-29 | End: 2022-05-31

## 2022-03-29 NOTE — TELEPHONE ENCOUNTER
RF done for ketoconazole cream to apply daily to penile rash until clear. OK to cancel appt with Dr Larios if no other issues to address and would have pt f/u with Urology as previously ordered and dicussed with pt

## 2022-03-29 NOTE — TELEPHONE ENCOUNTER
Jamee Pérez calling for the patient. No consent to communicate in chart. Patient gave verbal consent to communicate with Jamee.    Patient is requesting for a refill of ketoconazole cream for penile irritation. Jamee states that the irritation never went completely away. Patient has been using the ketoconazole cream off and on for improvement of symptoms. Patient has not scheduled with urology as advised. Writer gave referral information and encouraged to schedule with urology ASAP.    Patient scheduled for virtual visit tomorrow with Dr. Larios for follow up and refill of ketoconazole. Medication is not on current medication list and patient was last seen for this 5/27/21.    Please advise is needs to keep this appointment. Triage to call the patient back if appointment not needed.   Valeri Walker RN

## 2022-03-29 NOTE — TELEPHONE ENCOUNTER
Spoke to patient to relay providers message. Patient will cancel appointment at clinic and agrees to follow up with Urology as discussed.

## 2022-03-31 ENCOUNTER — HOSPITAL ENCOUNTER (EMERGENCY)
Facility: CLINIC | Age: 27
Discharge: HOME | End: 2022-03-31
Attending: EMERGENCY MEDICINE | Admitting: EMERGENCY MEDICINE
Payer: COMMERCIAL

## 2022-03-31 ENCOUNTER — APPOINTMENT (OUTPATIENT)
Dept: CT IMAGING | Facility: CLINIC | Age: 27
End: 2022-03-31
Attending: EMERGENCY MEDICINE
Payer: COMMERCIAL

## 2022-03-31 VITALS
BODY MASS INDEX: 24.14 KG/M2 | RESPIRATION RATE: 16 BRPM | DIASTOLIC BLOOD PRESSURE: 84 MMHG | HEART RATE: 82 BPM | OXYGEN SATURATION: 98 % | SYSTOLIC BLOOD PRESSURE: 125 MMHG | HEIGHT: 66 IN | TEMPERATURE: 98.2 F | WEIGHT: 150.2 LBS

## 2022-03-31 DIAGNOSIS — K58.9 IRRITABLE BOWEL SYNDROME, UNSPECIFIED TYPE: ICD-10-CM

## 2022-03-31 DIAGNOSIS — R10.12 LUQ ABDOMINAL PAIN: ICD-10-CM

## 2022-03-31 LAB
ALBUMIN SERPL-MCNC: 4.7 G/DL (ref 3.4–5)
ALBUMIN UR-MCNC: NEGATIVE MG/DL
ALP SERPL-CCNC: 69 U/L (ref 40–150)
ALT SERPL W P-5'-P-CCNC: 34 U/L (ref 0–70)
ANION GAP SERPL CALCULATED.3IONS-SCNC: 7 MMOL/L (ref 3–14)
APPEARANCE UR: CLEAR
AST SERPL W P-5'-P-CCNC: 24 U/L (ref 0–45)
BASOPHILS # BLD AUTO: 0 10E3/UL (ref 0–0.2)
BASOPHILS NFR BLD AUTO: 1 %
BILIRUB SERPL-MCNC: 0.5 MG/DL (ref 0.2–1.3)
BILIRUB UR QL STRIP: NEGATIVE
BUN SERPL-MCNC: 11 MG/DL (ref 7–30)
CALCIUM SERPL-MCNC: 9.6 MG/DL (ref 8.5–10.1)
CHLORIDE BLD-SCNC: 103 MMOL/L (ref 94–109)
CO2 SERPL-SCNC: 26 MMOL/L (ref 20–32)
COLOR UR AUTO: NORMAL
CREAT SERPL-MCNC: 0.82 MG/DL (ref 0.66–1.25)
EOSINOPHIL # BLD AUTO: 0 10E3/UL (ref 0–0.7)
EOSINOPHIL NFR BLD AUTO: 0 %
ERYTHROCYTE [DISTWIDTH] IN BLOOD BY AUTOMATED COUNT: 11.5 % (ref 10–15)
GFR SERPL CREATININE-BSD FRML MDRD: >90 ML/MIN/1.73M2
GLUCOSE BLD-MCNC: 98 MG/DL (ref 70–99)
GLUCOSE UR STRIP-MCNC: NEGATIVE MG/DL
HCT VFR BLD AUTO: 48.9 % (ref 40–53)
HGB BLD-MCNC: 16.3 G/DL (ref 13.3–17.7)
HGB UR QL STRIP: NEGATIVE
IMM GRANULOCYTES # BLD: 0 10E3/UL
IMM GRANULOCYTES NFR BLD: 0 %
KETONES UR STRIP-MCNC: NEGATIVE MG/DL
LEUKOCYTE ESTERASE UR QL STRIP: NEGATIVE
LIPASE SERPL-CCNC: 73 U/L (ref 73–393)
LYMPHOCYTES # BLD AUTO: 1.4 10E3/UL (ref 0.8–5.3)
LYMPHOCYTES NFR BLD AUTO: 24 %
MCH RBC QN AUTO: 29.4 PG (ref 26.5–33)
MCHC RBC AUTO-ENTMCNC: 33.3 G/DL (ref 31.5–36.5)
MCV RBC AUTO: 88 FL (ref 78–100)
MONOCYTES # BLD AUTO: 0.5 10E3/UL (ref 0–1.3)
MONOCYTES NFR BLD AUTO: 8 %
NEUTROPHILS # BLD AUTO: 4 10E3/UL (ref 1.6–8.3)
NEUTROPHILS NFR BLD AUTO: 67 %
NITRATE UR QL: NEGATIVE
NRBC # BLD AUTO: 0 10E3/UL
NRBC BLD AUTO-RTO: 0 /100
PH UR STRIP: 6 [PH] (ref 5–7)
PLATELET # BLD AUTO: 245 10E3/UL (ref 150–450)
POTASSIUM BLD-SCNC: 4 MMOL/L (ref 3.4–5.3)
PROT SERPL-MCNC: 8.6 G/DL (ref 6.8–8.8)
RBC # BLD AUTO: 5.54 10E6/UL (ref 4.4–5.9)
RBC URINE: 0 /HPF
SODIUM SERPL-SCNC: 136 MMOL/L (ref 133–144)
SP GR UR STRIP: 1.01 (ref 1–1.03)
UROBILINOGEN UR STRIP-MCNC: NORMAL MG/DL
WBC # BLD AUTO: 6 10E3/UL (ref 4–11)
WBC URINE: <1 /HPF

## 2022-03-31 PROCEDURE — 83690 ASSAY OF LIPASE: CPT | Performed by: EMERGENCY MEDICINE

## 2022-03-31 PROCEDURE — 99285 EMERGENCY DEPT VISIT HI MDM: CPT | Mod: 25

## 2022-03-31 PROCEDURE — 250N000009 HC RX 250: Performed by: EMERGENCY MEDICINE

## 2022-03-31 PROCEDURE — 74177 CT ABD & PELVIS W/CONTRAST: CPT

## 2022-03-31 PROCEDURE — 81001 URINALYSIS AUTO W/SCOPE: CPT | Performed by: EMERGENCY MEDICINE

## 2022-03-31 PROCEDURE — 82040 ASSAY OF SERUM ALBUMIN: CPT | Performed by: EMERGENCY MEDICINE

## 2022-03-31 PROCEDURE — 36415 COLL VENOUS BLD VENIPUNCTURE: CPT | Performed by: EMERGENCY MEDICINE

## 2022-03-31 PROCEDURE — 80053 COMPREHEN METABOLIC PANEL: CPT | Performed by: EMERGENCY MEDICINE

## 2022-03-31 PROCEDURE — 250N000011 HC RX IP 250 OP 636: Performed by: EMERGENCY MEDICINE

## 2022-03-31 PROCEDURE — 85025 COMPLETE CBC W/AUTO DIFF WBC: CPT | Performed by: EMERGENCY MEDICINE

## 2022-03-31 RX ORDER — IOPAMIDOL 755 MG/ML
75 INJECTION, SOLUTION INTRAVASCULAR ONCE
Status: COMPLETED | OUTPATIENT
Start: 2022-03-31 | End: 2022-03-31

## 2022-03-31 RX ADMIN — IOPAMIDOL 75 ML: 755 INJECTION, SOLUTION INTRAVENOUS at 17:21

## 2022-03-31 RX ADMIN — SODIUM CHLORIDE 62 ML: 9 INJECTION, SOLUTION INTRAVENOUS at 17:21

## 2022-03-31 ASSESSMENT — ENCOUNTER SYMPTOMS
NAUSEA: 0
FLANK PAIN: 1
VOMITING: 0
FREQUENCY: 1
BACK PAIN: 1
CONSTIPATION: 1
HEMATURIA: 0
FEVER: 0

## 2022-03-31 NOTE — ED TRIAGE NOTES
"Pt has hx of constipation and has been had a workup and has been eating more fiber.  Had a recent colonoscopy in Feb, findings normal, except internal hemorrhoids.  Recently pt is having to go to the bathroom every time he eats.  When pt is sitting he reports he feels the urge to go to bathroom and then can't go.  Reports right sided flank to abd pain.  Pain is constant. Pt is concerned about the pain and the continued problem.  Pt reports he was taking bentyl which helped but made pt feel tired.  Has been taking clonazepam and \"nesajar\" from doctor in [place].   "

## 2022-03-31 NOTE — DISCHARGE INSTRUCTIONS
Follow-up with your gastroenterologist in 1 to 2 weeks.    Return to the ER for any further problems.

## 2022-03-31 NOTE — ED PROVIDER NOTES
History   Chief Complaint:  Flank Pain     The history is provided by the patient and the spouse.      Rian Pérez is an otherwise healthy 26 year old male who presents with flank pain. The patient reports a few months of constipation and associated left flank pain radiating into his back. He often gets a strong urge to pass bowels but then is unable to, and this urge is worse when sitting. He has added more fiber to his diet recently without improvement. He notes a few days of green stools. The flank pain used to be intermittent but has become more constant within the last few days. He currently rates it a 7/10 in severity. He denies associated fevers, nausea, or vomiting. No recent travel. No sick contacts. He had a colonoscopy with [facility] in February that he notes was unremarkable aside from some internal hemorrhoids. He also had an unremarkable x-ray of his abdomen in February (see below). The patient also notes increased frequency of urination but believes this could be due to a penile rash for which he is using ketoconazole cream. He plans to see urology for this. No hematuria.     XR Abdomen from 02/11/2022:  Since September 26, 2021, there remains no intraperitoneal  air. No dilated air-filled loops of small bowel. Small amount of stool  scattered throughout the colon.   Reading per radiology.    Review of Systems   Constitutional: Negative for fever.   Gastrointestinal: Positive for constipation. Negative for nausea and vomiting.   Genitourinary: Positive for flank pain (left) and frequency. Negative for hematuria.   Musculoskeletal: Positive for back pain.   All other systems reviewed and are negative.        Allergies:  No Known Allergies    Medications:  Bentyl     Past Medical History:     The patient denies past medical history.     Past Surgical History:  Colonoscopy    Family History:  GERD, father      Social History:  Presents to ED with wife.    Physical Exam     Patient Vitals for the past 24  "hrs:   BP Temp Temp src Pulse Resp SpO2 Height Weight   03/31/22 1900 -- -- -- -- 16 98 % -- --   03/31/22 1845 125/84 -- -- -- -- -- -- --   03/31/22 1419 (!) 141/80 98.2  F (36.8  C) Temporal 82 15 100 % 1.676 m (5' 6\") 68.1 kg (150 lb 3.2 oz)       Physical Exam  Constitutional:       General: He is not in acute distress.     Appearance: He is not diaphoretic.   HENT:      Head: Atraumatic.   Eyes:      General: No scleral icterus.     Pupils: Pupils are equal, round, and reactive to light.   Cardiovascular:      Heart sounds: Normal heart sounds.   Pulmonary:      Effort: No respiratory distress.      Breath sounds: Normal breath sounds.   Abdominal:      General: Bowel sounds are normal.      Palpations: Abdomen is soft.      Tenderness: There is no abdominal tenderness.   Genitourinary:     Penis: Uncircumcised.       Comments: No hernia.  Musculoskeletal:         General: No tenderness.   Skin:     General: Skin is warm.      Findings: No rash.       Emergency Department Course     Imaging:  CT Abdomen Pelvis w Contrast   Final Result   IMPRESSION:    1.  Negative CT of the abdomen and pelvis.        Report per radiology    Laboratory:  Labs Ordered and Resulted from Time of ED Arrival to Time of ED Departure   COMPREHENSIVE METABOLIC PANEL - Normal       Result Value    Sodium 136      Potassium 4.0      Chloride 103      Carbon Dioxide (CO2) 26      Anion Gap 7      Urea Nitrogen 11      Creatinine 0.82      Calcium 9.6      Glucose 98      Alkaline Phosphatase 69      AST 24      ALT 34      Protein Total 8.6      Albumin 4.7      Bilirubin Total 0.5      GFR Estimate >90     LIPASE - Normal    Lipase 73     ROUTINE UA WITH MICROSCOPIC REFLEX TO CULTURE - Normal    Color Urine Straw      Appearance Urine Clear      Glucose Urine Negative      Bilirubin Urine Negative      Ketones Urine Negative      Specific Gravity Urine 1.008      Blood Urine Negative      pH Urine 6.0      Protein Albumin Urine Negative "      Urobilinogen Urine Normal      Nitrite Urine Negative      Leukocyte Esterase Urine Negative      RBC Urine 0      WBC Urine <1     CBC WITH PLATELETS AND DIFFERENTIAL    WBC Count 6.0      RBC Count 5.54      Hemoglobin 16.3      Hematocrit 48.9      MCV 88      MCH 29.4      MCHC 33.3      RDW 11.5      Platelet Count 245      % Neutrophils 67      % Lymphocytes 24      % Monocytes 8      % Eosinophils 0      % Basophils 1      % Immature Granulocytes 0      NRBCs per 100 WBC 0      Absolute Neutrophils 4.0      Absolute Lymphocytes 1.4      Absolute Monocytes 0.5      Absolute Eosinophils 0.0      Absolute Basophils 0.0      Absolute Immature Granulocytes 0.0      Absolute NRBCs 0.0        Emergency Department Course:     Reviewed:  I reviewed nursing notes, vitals, past medical history and Care Everywhere.    Assessments:  1553 I obtained history and examined the patient as noted above.   1841 I rechecked the patient and explained findings.     Disposition:  The patient was discharged to home.     Impression & Plan     Medical Decision Making:  This patient is a pleasant 26-year-old man who presents with his wife with complaint of left lateral abdominal pain and urgency to move his bowels.  This has been ongoing for several months.  He had a colonoscopy last month that reportedly was normal.  He had been prescribed MiraLAX but takes it inconsistently.  He presents to the ED with ongoing pain at times in the lateral abdomen.  He does not have any tenderness on exam today and is afebrile.  White count is normal.  He did undergo a CT scan to look for evidence of ureteral stone, diverticulitis, bowel obstruction, or other acute pathology.  This was not seen fortunately.  Urine looks good.    The patient says his stools have been greenish in color lately.  I do not see in his chart and he tells me that he has never had any stool studies.  So this should be done looking for potential parasitic infection in her  bacterial infection.  The patient tried multiple times and was not able to give a bowel movement here.  He will follow this up as an outpatient with his gastroenterologist.  He was asked to go back and be seen for follow-up.  I suspect that he may have irritable bowel syndrome.  I will start him on psyllium as he did not have much improvement with MiraLAX per his report.    We discussed return precautions.    Diagnosis:    ICD-10-CM    1. LUQ abdominal pain  R10.12    2. Irritable bowel syndrome, unspecified type  K58.9      Discharge Medications:  Discharge Medication List as of 3/31/2022  6:54 PM      START taking these medications    Details   psyllium (METAMUCIL SMOOTH TEXTURE) 28 % packet Use 1 packet daily, Disp-1 each, R-0, E-Prescribe           Scribe Disclosure:  I, Estrella Harris, am serving as a scribe at 3:45 PM on 3/31/2022 to document services personally performed by Santos López MD based on my observations and the provider's statements to me.      Santos López MD  03/31/22 9114

## 2022-04-04 ENCOUNTER — NURSE TRIAGE (OUTPATIENT)
Dept: NURSING | Facility: CLINIC | Age: 27
End: 2022-04-04
Payer: COMMERCIAL

## 2022-04-04 NOTE — TELEPHONE ENCOUNTER
Triage call:     Patient's wife is calling. She is not with the patient.   She reports he was seen in ER 3/31 and is requesting an order for stool sample.   Per ER notes, patient was instructed to follow up with gastroenterologist.   No PCP.     Advised wife to make follow up appointment to have stool sample ordered. She does not want to make a follow up appointment and is insistent on speaking with the [facility] ER directly.     Glenny Us RN   04/04/22 9:58 AM  [facility] Nurse Advisor    Additional Information    Information only question and nurse able to answer    Protocols used: INFORMATION ONLY CALL - NO TRIAGE-A-OH

## 2022-04-05 ENCOUNTER — VIRTUAL VISIT (OUTPATIENT)
Dept: FAMILY MEDICINE | Facility: CLINIC | Age: 27
End: 2022-04-05
Payer: COMMERCIAL

## 2022-04-05 DIAGNOSIS — R15.2 FECAL URGENCY: Primary | ICD-10-CM

## 2022-04-05 DIAGNOSIS — K64.4 EXTERNAL HEMORRHOIDS: ICD-10-CM

## 2022-04-05 PROCEDURE — 99213 OFFICE O/P EST LOW 20 MIN: CPT | Mod: GT | Performed by: INTERNAL MEDICINE

## 2022-04-05 ASSESSMENT — ENCOUNTER SYMPTOMS
COUGH: 0
PALPITATIONS: 0
HEMATOCHEZIA: 0
NAUSEA: 0
SHORTNESS OF BREATH: 0
BACK PAIN: 1
RECTAL PAIN: 1
ABDOMINAL DISTENTION: 0
NUMBNESS: 0
HEADACHES: 0
ABDOMINAL PAIN: 1
FATIGUE: 1
FEVER: 0
FREQUENCY: 0
NERVOUS/ANXIOUS: 0
WEAKNESS: 0
CHILLS: 0
DIARRHEA: 0
UNEXPECTED WEIGHT CHANGE: 0
VOMITING: 0
ANAL BLEEDING: 0

## 2022-04-05 NOTE — PROGRESS NOTES
Rian is a 26 year old who is being evaluated via a billable video visit.      How would you like to obtain your AVS? MyChart  If the video visit is dropped, the invitation should be resent by: Text to cell phone: [phone]  Will anyone else be joining your video visit? No      Video Start Time: 4:35 PM    Assessment & Plan     Fecal urgency  Complaint of rectal and abdominal pain with a sense of fecal urgency.  Patient is not constipated as he has a bowel movement every morning.  Will order the requested stool studies though I doubt they will be helpful.  Recommended that he follow-up with his gastroenterologist.    External hemorrhoids  Hemorrhoids might explain some of its discomfort though he has no bleeding.                   No follow-ups on file.    Venu Quigley MD  [facility]    Subjective   Rian is a 26 year old who presents for the following health issues  accompanied by his spouse.    HPI     Follow up ED visit 3/31 for LUQ pain/IBS. Pt states no change in sx. Was advised to do stool studies. Is using Miralax hs.     Patient was instructed to do stool studies after recent ED visit.  Patient has had 9 months of gastrointestinal complaints.  He gets rectal discomfort when driving in the car but not at other times.  He feels a sense of fecal urgency after eating but is unable to have a bowel movement.  He has a soft brown bowel movement every morning.  Does not describe lumpier hard stools.  Never has diarrhea, nausea, or vomiting.  He quit strength training which she began a year ago.  He has lost about 10 pounds since that time.  He has had a colonoscopy which she tells me revealed hemorrhoids and no other findings.  When he has abdominal discomfort since the left abdomen and flank area.  Occasional back pain.  Has felt a bit fatigued.    Review of Systems   Constitutional: Positive for fatigue. Negative for chills, fever and unexpected weight change.   Eyes:  Negative for visual disturbance.   Respiratory: Negative for cough and shortness of breath.    Cardiovascular: Negative for chest pain, palpitations and peripheral edema.   Gastrointestinal: Positive for abdominal pain and rectal pain. Negative for abdominal distention, anal bleeding, diarrhea, hematochezia, nausea and vomiting.   Genitourinary: Negative for frequency and urgency.   Musculoskeletal: Positive for back pain.   Neurological: Negative for weakness, numbness and headaches.   Psychiatric/Behavioral: Positive for mood changes. The patient is not nervous/anxious.          Objective           Vitals:  No vitals were obtained today due to virtual visit.    Physical Exam   GENERAL: Healthy, alert and no distress  EYES: Eyes grossly normal to inspection.  No discharge or erythema, or obvious scleral/conjunctival abnormalities.  RESP: No audible wheeze, cough, or visible cyanosis.  No visible retractions or increased work of breathing.    SKIN: Visible skin clear. No significant rash, abnormal pigmentation or lesions.  NEURO: Cranial nerves grossly intact.  Mentation and speech appropriate for age.  PSYCH: Mentation appears normal, affect normal/bright, judgement and insight intact, normal speech and appearance well-groomed.      Lab work in the ED March 31, 2022 revealed a normal comprehensive metabolic profile, lipase, urinalysis, and CBC.  Negative contrast CT abdomen and pelvis.            Video-Visit Details    Type of service:  Video Visit    Video End Time:4:57 PM    Originating Location (pt. Location): Home    Distant Location (provider location):  [facility]     Platform used for Video Visit: [employer]

## 2022-04-11 ENCOUNTER — LAB (OUTPATIENT)
Dept: LAB | Facility: CLINIC | Age: 27
End: 2022-04-11
Payer: COMMERCIAL

## 2022-04-11 DIAGNOSIS — R15.2 FECAL URGENCY: ICD-10-CM

## 2022-04-11 PROCEDURE — 87506 IADNA-DNA/RNA PROBE TQ 6-11: CPT

## 2022-04-11 PROCEDURE — 87209 SMEAR COMPLEX STAIN: CPT

## 2022-04-11 PROCEDURE — 87177 OVA AND PARASITES SMEARS: CPT

## 2022-04-12 ENCOUNTER — TELEPHONE (OUTPATIENT)
Dept: INTERNAL MEDICINE | Facility: CLINIC | Age: 27
End: 2022-04-12
Payer: COMMERCIAL

## 2022-04-12 NOTE — TELEPHONE ENCOUNTER
Received a call from the patient's wife, Jamee, asking about the stool sample results. Informed wife that stool sample was still in process and can take 48-72 hours to return. Wife expressed understanding and had no further questions at this time.     Ritu Rodriguez RN

## 2022-04-13 DIAGNOSIS — A07.8 BLASTOCYSTIS HOMINIS INFECTION: Primary | ICD-10-CM

## 2022-04-13 LAB
O+P STL MICRO: ABNORMAL

## 2022-04-13 RX ORDER — METRONIDAZOLE 250 MG/1
250 TABLET ORAL 3 TIMES DAILY
Qty: 30 TABLET | Refills: 0 | Status: SHIPPED | OUTPATIENT
Start: 2022-04-13 | End: 2022-04-23

## 2022-04-22 ENCOUNTER — OFFICE VISIT (OUTPATIENT)
Dept: DERMATOLOGY | Facility: CLINIC | Age: 27
End: 2022-04-22
Payer: COMMERCIAL

## 2022-04-22 DIAGNOSIS — L73.9 FOLLICULITIS: ICD-10-CM

## 2022-04-22 DIAGNOSIS — L60.8 LONGITUDINAL MELANONYCHIA: Primary | ICD-10-CM

## 2022-04-22 DIAGNOSIS — D18.01 CHERRY ANGIOMA: ICD-10-CM

## 2022-04-22 PROCEDURE — 99213 OFFICE O/P EST LOW 20 MIN: CPT | Mod: GC | Performed by: DERMATOLOGY

## 2022-04-22 RX ORDER — CLINDAMYCIN PHOSPHATE 11.9 MG/ML
SOLUTION TOPICAL
Qty: 60 ML | Refills: 4 | Status: SHIPPED | OUTPATIENT
Start: 2022-04-22 | End: 2022-05-31

## 2022-04-22 ASSESSMENT — PAIN SCALES - GENERAL: PAINLEVEL: NO PAIN (0)

## 2022-04-22 NOTE — LETTER
Date:April 25, 2022      Patient was self referred, no letter generated. Do not send.        [facility] Health Information

## 2022-04-22 NOTE — PROGRESS NOTES
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 Reassured that this is a common benign finding in individuals with darker skin tones. However counseled on signs of melanoma including evolving, changing, or growing pigmentation in the nail or surrounding it.  - patient to notify us of any concerning changes     # Folliculitis, scalp   Appears mostly clear today, but patient does report occasionally getting pimples all over the scalp. Previously responded well to doxy but had some GI issues  - Start BPO wash daily in shower  - Start clinda solution BID prn    # Cherry angiomas  - Discussed the natural history and benign nature of this lesion. Reassured that treatment is not needed at this time.       Procedures Performed:   None    Follow-up: prn for new or changing lesions    Staff and Resident:     Staffed with Dr. Carina Valencia MD (PGY-2)   Dermatology Resident    Staff Physician Comments:   I saw and evaluated the patient with the resident and I agree with the assessment and plan.  I was present for the examination.    Cande Lim MD    Department of Dermatology  [facility]: Phone: [phone], Fax: [phone]  4/25/2022        ____________________________________________    CC: Derm Problem (States he wants pinky nail looked at, also states he has spots on arms and chest)    HPI:  Mr. Rian Pérez is a(n) 26 year old male who presents today as a return patient for evaluation of nail changes and red spots. The patient was last seen in dermatology by Ananya Marcial PA-C at [facility] on 08/20/2021 at which point patient started on doxycycline 100 mg BID for a month and clindamycin solution mixed 1:1 with BPO gel BID PRN. Today, the patient reports the folliculitis is stable & overall pretty clear today. Still gets pimples on the scalp occasionally. Previously cleared with doxy. Never really used the topicals previously prescribed in the past.    Chief complaint today are the following:  - R pinky nail with with faint tan line that he noticed recently  - Small red spots on arms and chest present for a few years    Lesions are asymptomatic. Patient is otherwise feeling well, without additional skin concerns.    Labs Reviewed:  N/A    Physical Exam:  Vitals: There were no vitals taken for this visit.  SKIN: Focused examination of scalp, trunk, arms, fingernails was performed.  - Occipital scalp with 1 pink-brown papule  - Arms and left chest with small red macules  - R 5th fingernail & L thumbnail with tan-brown longitudinal band. no Aggarwal sign or periungual pigmentation  - No other lesions of concern on areas examined.     Medications:  Current Outpatient Medications   Medication     ketoconazole (NIZORAL) 2 % external cream     metroNIDAZOLE (FLAGYL) 250 MG tablet     polyethylene glycol (MIRALAX) 17 GM/Dose powder     psyllium (METAMUCIL SMOOTH TEXTURE) 28 % packet     No current facility-administered medications for this visit.      Past Medical History:   There is no problem list on file for this patient.    No past medical history on file.     CC Referred Self, MD  No address on file on close  of this encounter.

## 2022-04-22 NOTE — NURSING NOTE
Dermatology Rooming Note    Rian Pérez's goals for this visit include:   Chief Complaint   Patient presents with     Derm Problem     States he wants pinky nail looked at, also states he has spots on arms and chest     Katlyn Sharp, Visit Facilitator

## 2022-04-22 NOTE — LETTER
4/22/2022       RE: Rian Pérez  [address]  [place] [facility] [address]     Dear Colleague,    Thank you for referring your patient, Rian Pérez, to the [facility] [place] at [facility]. Please see a copy of my visit note below.    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 Reassured that this is a common benign finding in individuals with darker skin tones. However counseled on signs of melanoma including evolving, changing, or growing pigmentation in the nail or surrounding it.  - patient to notify us of any concerning changes     # Folliculitis, scalp   Appears mostly clear today, but patient does report occasionally getting pimples all over the scalp. Previously responded well to doxy but had some GI issues  - Start BPO wash daily in shower  - Start clinda solution BID prn    # Cherry angiomas  - Discussed the natural history and benign nature of this lesion. Reassured that treatment is not needed at this time.       Procedures Performed:   None    Follow-up: prn for new or changing lesions    Staff and Resident:     Staffed with Dr. Carina Valencia MD (PGY-2)   Dermatology Resident    Staff Physician Comments:   I saw and evaluated the patient with the  resident and I agree with the assessment and plan.  I was present for the examination.    Cande Lim MD    Department of Dermatology  [facility]: Phone: [phone], Fax: [phone]  4/25/2022       ____________________________________________    CC: Derm Problem (States he wants pinky nail looked at, also states he has spots on arms and chest)    HPI:  Mr. Rian Pérez is a(n) 26 year old male who presents today as a return patient for evaluation of nail changes and red spots. The patient was last seen in dermatology by Ananya Marcial PA-C at [facility] on 08/20/2021 at which point patient started on doxycycline 100 mg BID for a month and clindamycin solution mixed 1:1 with BPO gel BID PRN. Today, the patient reports the folliculitis is stable & overall pretty clear today. Still gets pimples on the scalp occasionally. Previously cleared with doxy. Never really used the topicals previously prescribed in the past.    Chief complaint today are the following:  - R pinky nail with with faint tan line that he noticed recently  - Small red spots on arms and chest present for a few years    Lesions are asymptomatic. Patient is otherwise feeling well, without additional skin concerns.    Labs Reviewed:  N/A    Physical Exam:  Vitals: There were no vitals taken for this visit.  SKIN: Focused examination of scalp, trunk, arms, fingernails was performed.  - Occipital scalp with 1 pink-brown papule  - Arms and left chest with small red macules  - R 5th fingernail & L thumbnail with tan-brown longitudinal band. no Aggarwal sign or periungual pigmentation  - No other lesions of concern on areas examined.     Medications:  Current Outpatient Medications   Medication     ketoconazole (NIZORAL) 2 % external cream     metroNIDAZOLE (FLAGYL) 250 MG tablet     polyethylene glycol (MIRALAX) 17  GM/Dose powder     psyllium (METAMUCIL SMOOTH TEXTURE) 28 % packet     No current facility-administered medications for this visit.      Past Medical History:   There is no problem list on file for this patient.    No past medical history on file.     CC Referred Self, MD  No address on file on close of this encounter.      Again, thank you for allowing me to participate in the care of your patient.      Sincerely,    Cande Lim MD

## 2022-04-22 NOTE — PATIENT INSTRUCTIONS
"- Start using benzoyl peroxide wash on scalp daily as needed      Benzoyl Peroxide - can be used safely on the face and body 1-2 times a day     This is an ingredient in many over the counter acne washes.  Make sure you look at the active ingredient list to ensure this is what is in the facial wash.  Benzoyl peroxide can range from 2.5% to 10%.  It does not matter which one you purchase, although the lower strengths tend to be less irritating to the skin with the same efficacy. Sometimes the lower percentages are labeled as \"Sensitive.\" I recommend anything between 2.5-5%. Be sure to rinse it off well or it can bleach your clothing/towels.     Here are easy-to-find brands that have the benzoyl peroxide ingredient:  - Neutrogena Clear Pore  - All AcneFree washes  - All PanOxyl washes  - Clean and Clear Continuous Control    You can find these in the acne isle at many grocery stores and pharmacies.    Alternatively, can use CLn cleanser (dilute bleach as the ingredient but works the same way, find on [employer]).    Be sure to check the ingredients as many acne washes actually contain salicylic acid and not benzoyl peroxide.      ______________________________________________________________________________________    What is acne?   - Acne is a disease of the skin pores (oil glands and ducts) of the face, back, chest and sometimes the arms. The oil ducts get clogged, then bacteria sets in. The skin s immune system responds to the clogged pores and bacteria and creates inflammation in the skin, so the clogged pores become swollen, hard, red, and painful. This process can lead to scarring and discoloration (called post inflammatory hyperpigmentation), especially in people with darker skin types.  - Hormones (generally testosterone) make acne worse.   - Sweating, too much oil or makeup that clogs up pores, and lack of consistent face washing can also make acne worse.   - Food can play a role for some people. [place] with " "limiting dairy, sugary foods, and gluten to see if you notice an improvement.    - A good resource is the Diet and Acne research done by [facility]. They even have a free jordan!     Why is  popping a pimple  bad?   - Because it lets the pus and bacteria out into the surrounding skin therefore spreading out the inflammation. Popping a larger or deeper pimple/nodule is worse than popping a little ceja.     How often should you wash your face?   - Twice a day, morning and night. And anytime after excessive sweating (ex: working out). Excessive washing can dry the skin and make acne worse.     What are some good moisturizers?  - Use a FACIAL moisturizer (not a body lotion).   - Do not use water-proof or water-resistant sunscreen on your face or acne-prone areas.   - Stick with well-known, tried-and-true name brands, like Neutrogena, Cerave, Vanicream, La Roche Posay, Clinique, Skin Ceuticals.   - Make sure what you are using on your face says \"non-comedogenic\" (non-pimple-casuing) on it. Just because a product says \"face\" on it doesn't mean it won't cause acne.    Can I use make up?   - Make-up is generally fine. Choose water-based products.  - Avoid pressed-powder, which contain more oils and waxes.  - People tend do well with mineral-based make-ups.  - Look for \"non-comedogenic,\" which means it does not clog pores.  - Wash make brushes and sponges with soap regularly.   - Do not share make up or brushes/sponges.     What is acne scarring?   - Caused by inflammation from acne. Can be either discoloration or depressed marks in the skin.   - Flat red/brown marks are not true scars. They occur naturally with healing and will gradually disappear. Topical azelaic acid (like The Ordinary Azelaic Acid Suspension) or retinoids (like tretinoin) might help. Talk to your doctor about what treatment is best for you.   - Depressed marks/pits are true scars and are much harder to treat. Therefore, it is imperative to " control acne before treating any deeper, pitted acne scarring.   - Use a sunscreen with only zinc or titanium (also called physical or mineral sunscreens) every day to help prevent discoloration.   - Chemical peels and lasers can help with both discoloration and true scars after acne is controlled.     Other prescriptions to treat acne:   - Antibiotics are sometimes necessary to decrease inflammation in the skin and reduce bacterial counts, and thus helping to prevent pimple formation. These can be either in the form of a topical product or an oral medication.   - Women only: birth control pills to regulate the female cycle or spironolactone to block the skin receptors from reacting to hormones so much   - Isotretinoin, a pill form of  retinoids,  derived from vitamin A, requires 4-6 months of therapy usually.     Final Tips:  - Wash twice a day and after sweating. Perspiration, especially when wearing a hat or helmet, can make acne worse, so wash your skin as soon as possible after sweating.  - Use your fingertips to apply a gentle, non-abrasive cleanser. Using a washcloth, mesh sponge or anything else can irritate the skin and is not recommended.  - Be gentle with your skin. Use gentle products, such as those that are alcohol-free. Do not use products that irritate your skin, which may include astringents, toners and exfoliants. Dry, red skin makes acne appear worse.  - Scrubbing your skin can make acne worse. Avoid the temptation to scrub your skin.  - Rinse with lukewarm water. Avoid very cold or hot water.   - Shampoo regularly. If you have oily hair, shampoo daily.  - Let your skin heal naturally. If you pick, pop or squeeze your acne, your skin will take longer to clear and you increase the risk of getting acne scars. Keep your hands off your face. Touching your skin throughout the day can cause flare-ups.  - Stay out of the sun and tanning beds. Tanning damages you skin. In addition, some acne medications  make the skin very sensitive to ultraviolet (UV) light, which you get from both the sun and indoor tanning devices.

## 2022-04-26 ENCOUNTER — TELEPHONE (OUTPATIENT)
Dept: INTERNAL MEDICINE | Facility: CLINIC | Age: 27
End: 2022-04-26
Payer: COMMERCIAL

## 2022-04-26 ENCOUNTER — NURSE TRIAGE (OUTPATIENT)
Dept: INTERNAL MEDICINE | Facility: CLINIC | Age: 27
End: 2022-04-26
Payer: COMMERCIAL

## 2022-04-26 NOTE — TELEPHONE ENCOUNTER
Pt's wife reports pt has fecal urgency. He finished 10 day course of Flagyl and is not seen any improvement of symptoms. Caller denies fever, blood in stool, abdominal pain. Pt also has seen GI and advised to have colonoscopy. Wife states he already had colonoscopy. Triage advised pt call GI with symptom update and find out if they and any new recommendations. Otherwise, have pt seen at . Wife verbalized understanding and agreement with plan.     Reason for Disposition    Patient wants to be seen    Additional Information    Negative: Sounds like a life-threatening emergency to the triager    Negative: Diarrhea is the main symptom    Negative: Constipation is the main symptom (e.g., pain or discomfort caused by passage of hard BMs)    Negative: Blood in or on bowel movement is the main symptom    Negative: Sexual assault    Negative: Injury to rectum    Negative: Patient sounds very sick or weak to the triager    Negative: Severe rectal pain    Negative: Rectal pain or redness and fever > 100.4 F (38.0 C)    Negative: Acute onset rectal pain and constipation (straining with rectal pressure or fullness), which is not relieved by Sitz bath or suppository    Negative: MODERATE-SEVERE rectal pain (i.e., interferes with school, work, or sleep)    Negative: MODERATE-SEVERE rectal itching (i.e., interferes with school, work, or sleep)    Negative: Last bowel movement (BM) > 4 days ago    Negative: Rectal area looks infected (e.g., draining sore, spreading redness)    Negative: Rash of rectal area (e.g., open sore, painful tiny water blisters, unexplained bumps)    Negative: Caller is worried about a sexually transmitted disease (STD)    Negative: Home treatment > 3 days for rectal pain and not improved    Negative: Home treatment for > 3 days for rectal itching and not improved    Protocols used: RECTAL SYMPTOMS-A-OH

## 2022-04-26 NOTE — TELEPHONE ENCOUNTER
Jamee dooley C called     Connected with patient and he gave verbal ok to speak with her     Issues - urgency to use the bathroom. Nothing comes out. Often with sitting/driving     Did testing and several visits     Went to [facility] - was having LLQ discomfort    Was unable to give stool sample at the time of visit     Did a VV - stool sample was ordered    Results showed virus - finished on Saturday     Has not felt any better/different since finishing medication     Asking if pt needs more medication/further lab testing/or follow up visit?     Sammie MELENDREZ, Triage RN  [facility]

## 2022-05-16 ENCOUNTER — HEALTH MAINTENANCE LETTER (OUTPATIENT)
Age: 27
End: 2022-05-16

## 2022-05-31 ENCOUNTER — VIRTUAL VISIT (OUTPATIENT)
Dept: FAMILY MEDICINE | Facility: CLINIC | Age: 27
End: 2022-05-31
Payer: COMMERCIAL

## 2022-05-31 DIAGNOSIS — K62.89 RECTAL DISCOMFORT: Primary | ICD-10-CM

## 2022-05-31 DIAGNOSIS — R63.4 WEIGHT LOSS: ICD-10-CM

## 2022-05-31 PROCEDURE — 99213 OFFICE O/P EST LOW 20 MIN: CPT | Mod: GT | Performed by: INTERNAL MEDICINE

## 2022-05-31 ASSESSMENT — ENCOUNTER SYMPTOMS
SHORTNESS OF BREATH: 0
DIARRHEA: 0
ARTHRALGIAS: 0
VOMITING: 0
MYALGIAS: 0
FATIGUE: 1
FEVER: 0
HEADACHES: 0
PALPITATIONS: 0
ABDOMINAL PAIN: 0
ABDOMINAL DISTENTION: 0
COUGH: 0

## 2022-05-31 NOTE — PROGRESS NOTES
"Rian is a 26 year old who is being evaluated via a billable video visit.      How would you like to obtain your AVS? MyChart  If the video visit is dropped, the invitation should be resent by: Text to cell phone: [phone]   Will anyone else be joining your video visit? No      Video Start Time: 1505    Assessment & Plan     Rectal discomfort  Chronic.  Patient just had hemorrhoidectomy with [facility] which is done a work-up on him to no effect.  He tends to notice it after he eats or if he is sitting.  Recommended follow-up with [facility]    Weight loss  Patient wants \"hormone levels.\" he has lost perhaps 10 pounds and has rectal complaints but really not a lot else.  He has had a normal TSH recently.  - Testosterone total; Future  - Cortisol; Future        {Provider  Link to [facility] Help Grid :507912}         No follow-ups on file.    Venu Quigley MD  [facility]    Subjective   Rian is a 26 year old who presents for the following health issues     History of Present Illness       Reason for visit:  Issue with constantly having the feeling of using the restroom    He eats 2-3 servings of fruits and vegetables daily.He consumes 1 sweetened beverage(s) daily.He exercises with enough effort to increase his heart rate 30 to 60 minutes per day.  He exercises with enough effort to increase his heart rate 5 days per week.   He is taking medications regularly.  Patient with chronic GI complaints and extensive work-up with [facility] including recent hemorrhoidectomy calls because he would like hormone levels.  He is complaining his rectal discomfort worse after sitting or eating.  He does not have diarrhea abdominal pain or other GI complaints currently.  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    Review of Systems   Constitutional: Positive for fatigue. Negative for fever.   Respiratory: Negative for cough and shortness of " breath.    Cardiovascular: Negative for chest pain, palpitations and peripheral edema.   Gastrointestinal: Negative for abdominal distention, abdominal pain, diarrhea and vomiting.   Endocrine: Negative for polyuria.   Musculoskeletal: Negative for arthralgias and myalgias.   Allergic/Immunologic: Negative for food allergies.   Neurological: Negative for headaches.          Objective           Vitals:  No vitals were obtained today due to virtual visit.    Physical Exam   GENERAL: Healthy, alert and no distress  EYES: Eyes grossly normal to inspection.  No discharge or erythema, or obvious scleral/conjunctival abnormalities.  RESP: No audible wheeze, cough, or visible cyanosis.  No visible retractions or increased work of breathing.    SKIN: Visible skin clear. No significant rash, abnormal pigmentation or lesions.  NEURO: Cranial nerves grossly intact.  Mentation and speech appropriate for age.  PSYCH: Mentation appears normal, affect normal/bright, judgement and insight intact, normal speech and appearance well-groomed.      March 31 patient had a normal CMP and lipase as well as CBC.  TSH normal 1/28/2022.        Video-Visit Details    Type of service:  Video Visit    Video End Time:5:19 PM    Originating Location (pt. Location): Home    Distant Location (provider location):  [facility]     Platform used for Video Visit: Jellycoaster

## 2022-06-04 ENCOUNTER — LAB (OUTPATIENT)
Dept: LAB | Facility: CLINIC | Age: 27
End: 2022-06-04
Payer: COMMERCIAL

## 2022-06-04 DIAGNOSIS — R63.4 WEIGHT LOSS: ICD-10-CM

## 2022-06-04 LAB — CORTIS SERPL-MCNC: 8.7 UG/DL (ref 4–22)

## 2022-06-04 PROCEDURE — 82533 TOTAL CORTISOL: CPT

## 2022-06-04 PROCEDURE — 36415 COLL VENOUS BLD VENIPUNCTURE: CPT

## 2022-06-04 PROCEDURE — 84403 ASSAY OF TOTAL TESTOSTERONE: CPT

## 2022-06-07 LAB — TESTOST SERPL-MCNC: 479 NG/DL (ref 240–950)

## 2022-07-01 ENCOUNTER — VIRTUAL VISIT (OUTPATIENT)
Dept: FAMILY MEDICINE | Facility: CLINIC | Age: 27
End: 2022-07-01
Payer: COMMERCIAL

## 2022-07-01 DIAGNOSIS — R19.8 CHANGE IN BOWEL MOVEMENT: Primary | ICD-10-CM

## 2022-07-01 PROCEDURE — 99213 OFFICE O/P EST LOW 20 MIN: CPT | Mod: GT | Performed by: INTERNAL MEDICINE

## 2022-07-01 ASSESSMENT — ENCOUNTER SYMPTOMS
HEMATOCHEZIA: 0
FATIGUE: 0
VOMITING: 0
UNEXPECTED WEIGHT CHANGE: 0
DIARRHEA: 1
COUGH: 0
SHORTNESS OF BREATH: 0
HEADACHES: 1
CONSTIPATION: 1
ABDOMINAL PAIN: 1
MYALGIAS: 1
APPETITE CHANGE: 0
DIFFICULTY URINATING: 0
FEVER: 0

## 2022-07-01 NOTE — PROGRESS NOTES
Rian is a 26 year old who is being evaluated via a billable video visit.      How would you like to obtain your AVS? MyChart  If the video visit is dropped, the invitation should be resent by: Text to cell phone: [phone]  Will anyone else be joining your video visit? No    Patient's wife is present for the visit.      Assessment & Plan     Change in bowel movement  Patient with somewhat vague abdominal complaints which have been worked up extensively with lab work which was normal, normal CT and a colonoscopy the results of which are reported to be normal.  He is concerned about gallbladder problems and wondering if he has gallstones we discussed his negative CT, and obtaining an ultrasound.  Is also concerned that he could have celiac though he does not describe a lot of diarrhea.  - Hepatic panel (Albumin, ALT, AST, Bili, Alk Phos, TP); Future  - Tissue transglutaminase annamaria IgA and IgG; Future                 No follow-ups on file.    Venu Quigley MD  [facility]    Subjective   Rian is a 26 year old accompanied by his self ., presenting for the following health issues:  No chief complaint on file.      History of Present Illness       Reason for visit:  Would like to schedule another lab    He eats 2-3 servings of fruits and vegetables daily.He consumes 0 sweetened beverage(s) daily.He exercises with enough effort to increase his heart rate 30 to 60 minutes per day.  He exercises with enough effort to increase his heart rate 4 days per week.   He is taking medications regularly.     Patient has chronic GI complaints which have been extensively evaluated.  Has had weight loss at times.  He is eating.  Does not have watery diarrhea.  Has had rectal complaints.  Had a colonoscopy in February.      Review of Systems   Constitutional: Negative for appetite change, fatigue, fever and unexpected weight change.   Respiratory: Negative for cough and shortness of breath.     Cardiovascular: Negative for chest pain.   Gastrointestinal: Positive for abdominal pain, constipation and diarrhea. Negative for hematochezia and vomiting.   Genitourinary: Negative for difficulty urinating.   Musculoskeletal: Positive for myalgias.   Neurological: Positive for headaches.            Objective           Vitals:  No vitals were obtained today due to virtual visit.    Physical Exam   GENERAL: Healthy, alert and no distress  EYES: Eyes grossly normal to inspection.  No discharge or erythema, or obvious scleral/conjunctival abnormalities.  RESP: No audible wheeze, cough, or visible cyanosis.  No visible retractions or increased work of breathing.    SKIN: Visible skin clear. No significant rash, abnormal pigmentation or lesions.  NEURO: Cranial nerves grossly intact.  Mentation and speech appropriate for age.  PSYCH: Mentation appears normal, affect normal/bright, judgement and insight intact, normal speech and appearance well-groomed.                Video-Visit Details    Video Start Time: 2:50    Type of service:  Video Visit    Video End Time:2:58 PM    Originating Location (pt. Location): Home    Distant Location (provider location):  [facility]     Platform used for Video Visit: [employer]    Brandy Schmidt

## 2022-07-02 ENCOUNTER — LAB (OUTPATIENT)
Dept: LAB | Facility: CLINIC | Age: 27
End: 2022-07-02
Payer: COMMERCIAL

## 2022-07-02 DIAGNOSIS — R19.8 CHANGE IN BOWEL MOVEMENT: ICD-10-CM

## 2022-07-02 PROCEDURE — 80076 HEPATIC FUNCTION PANEL: CPT

## 2022-07-02 PROCEDURE — 36415 COLL VENOUS BLD VENIPUNCTURE: CPT

## 2022-07-02 PROCEDURE — 86364 TISS TRNSGLTMNASE EA IG CLAS: CPT

## 2022-07-03 ENCOUNTER — MYC MEDICAL ADVICE (OUTPATIENT)
Dept: FAMILY MEDICINE | Facility: CLINIC | Age: 27
End: 2022-07-03

## 2022-07-03 LAB
ALBUMIN SERPL-MCNC: 4.9 G/DL (ref 3.4–5)
ALP SERPL-CCNC: 71 U/L (ref 40–150)
ALT SERPL W P-5'-P-CCNC: 40 U/L (ref 0–70)
AST SERPL W P-5'-P-CCNC: 27 U/L (ref 0–45)
BILIRUB DIRECT SERPL-MCNC: 0.1 MG/DL (ref 0–0.2)
BILIRUB SERPL-MCNC: 0.7 MG/DL (ref 0.2–1.3)
PROT SERPL-MCNC: 8.8 G/DL (ref 6.8–8.8)

## 2022-07-05 LAB
TTG IGA SER-ACNC: 0.8 U/ML
TTG IGG SER-ACNC: <0.6 U/ML

## 2022-07-05 NOTE — TELEPHONE ENCOUNTER
CT scan is the best test for appendicitis and yours was negative. You have not had symptoms suggestive of appendicitis. I do not think that you need another test.

## 2022-09-11 ENCOUNTER — HEALTH MAINTENANCE LETTER (OUTPATIENT)
Age: 27
End: 2022-09-11

## 2022-11-15 ENCOUNTER — OFFICE VISIT (OUTPATIENT)
Dept: INTERNAL MEDICINE | Facility: CLINIC | Age: 27
End: 2022-11-15
Payer: COMMERCIAL

## 2022-11-15 VITALS
WEIGHT: 139.7 LBS | SYSTOLIC BLOOD PRESSURE: 134 MMHG | TEMPERATURE: 99.3 F | DIASTOLIC BLOOD PRESSURE: 74 MMHG | HEART RATE: 86 BPM | OXYGEN SATURATION: 100 % | BODY MASS INDEX: 22.55 KG/M2

## 2022-11-15 DIAGNOSIS — F32.0 MILD MAJOR DEPRESSION (H): ICD-10-CM

## 2022-11-15 DIAGNOSIS — F41.9 ANXIETY: ICD-10-CM

## 2022-11-15 DIAGNOSIS — K58.9 IRRITABLE BOWEL SYNDROME, UNSPECIFIED TYPE: Primary | ICD-10-CM

## 2022-11-15 PROCEDURE — 99214 OFFICE O/P EST MOD 30 MIN: CPT | Performed by: INTERNAL MEDICINE

## 2022-11-15 RX ORDER — ESCITALOPRAM OXALATE 5 MG/1
5 TABLET ORAL DAILY
Qty: 30 TABLET | Refills: 11 | Status: SHIPPED | OUTPATIENT
Start: 2022-11-15 | End: 2022-12-16 | Stop reason: DRUGHIGH

## 2022-11-15 ASSESSMENT — PATIENT HEALTH QUESTIONNAIRE - PHQ9
10. IF YOU CHECKED OFF ANY PROBLEMS, HOW DIFFICULT HAVE THESE PROBLEMS MADE IT FOR YOU TO DO YOUR WORK, TAKE CARE OF THINGS AT HOME, OR GET ALONG WITH OTHER PEOPLE: VERY DIFFICULT
SUM OF ALL RESPONSES TO PHQ QUESTIONS 1-9: 6
SUM OF ALL RESPONSES TO PHQ QUESTIONS 1-9: 6

## 2022-11-15 ASSESSMENT — ANXIETY QUESTIONNAIRES
7. FEELING AFRAID AS IF SOMETHING AWFUL MIGHT HAPPEN: SEVERAL DAYS
1. FEELING NERVOUS, ANXIOUS, OR ON EDGE: NEARLY EVERY DAY
3. WORRYING TOO MUCH ABOUT DIFFERENT THINGS: SEVERAL DAYS
6. BECOMING EASILY ANNOYED OR IRRITABLE: SEVERAL DAYS
7. FEELING AFRAID AS IF SOMETHING AWFUL MIGHT HAPPEN: SEVERAL DAYS
GAD7 TOTAL SCORE: 12
5. BEING SO RESTLESS THAT IT IS HARD TO SIT STILL: NOT AT ALL
4. TROUBLE RELAXING: NEARLY EVERY DAY
GAD7 TOTAL SCORE: 12
2. NOT BEING ABLE TO STOP OR CONTROL WORRYING: NEARLY EVERY DAY
GAD7 TOTAL SCORE: 12

## 2022-11-15 NOTE — PROGRESS NOTES
ASSESSMENT:   1. Irritable bowel syndrome, unspecified type  GI and pelvic floor center notes from [facility] reviewed.  Continue GI management.  We will treat for anxiety in addition.  Start with Lexapro and referral to mental health counseling.  Possible trial of gabapentin in the future at symptoms not improving  - escitalopram (LEXAPRO) 5 MG tablet; Take 1 tablet (5 mg) by mouth daily  Dispense: 30 tablet; Refill: 11  [facility]; Future    2. Mild major depression (H)  Uncontrolled.  Denies suicidal ideation.  No specific underlying cause noted for symptoms.  Will start Lexapro along with counseling  - escitalopram (LEXAPRO) 5 MG tablet; Take 1 tablet (5 mg) by mouth daily  Dispense: 30 tablet; Refill: 11  [facility] Future    3. Anxiety  Uncontrolled. See #2 above  - escitalopram (LEXAPRO) 5 MG tablet; Take 1 tablet (5 mg) by mouth daily  Dispense: 30 tablet; Refill: 11  - [facility] Referral; Future      PLAN:  Escitalopram 5mg tab. Start by taking 1/2 tab daily for 2 days. Then  increase the dose to 1 tab daily.  Take in the AM with food.  Possible initial side effects include jitteriness, nausea, insomnia. These are generally mild if present and resolve within a couple weeks. If severe side effects with the medication, then stop the medication and contact the clinic. Also contact the clinic immediately if you develop any suicidal ideation   Schedule follow-up virtual  visit with me  in 3 weeks  Referral to [facility] counseling. They will call  to schedule  Slow  box breathing  techniques reviewed  for relaxation  Continue other management per GI      (Chart documentation was completed, in part, with [employer] voice-recognition software. Even though reviewed, some grammatical, spelling, and word errors may remain.)    Carlos Maradiaga MD  Internal Medicine Department  [facility] is  a 27 year old, presenting for the following health issues:  Anxiety      History of Present Illness       Mental Health Follow-up:  Patient presents to follow-up on Depression & Anxiety.Patient's depression since last visit has been:  No change  The patient is not having other symptoms associated with depression.  Patient's anxiety since last visit has been:  No change  The patient is not having other symptoms associated with anxiety.  Any significant life events: other  Patient is feeling anxious or having panic attacks.  Patient has no concerns about alcohol or drug use.    He eats 0-1 servings of fruits and vegetables daily.He consumes 3 sweetened beverage(s) daily.He exercises with enough effort to increase his heart rate 9 or less minutes per day.  He exercises with enough effort to increase his heart rate 3 or less days per week.   He is taking medications regularly.    Today's PHQ-9         PHQ-9 Total Score: 6    PHQ-9 Q9 Thoughts of better off dead/self-harm past 2 weeks :   Not at all    How difficult have these problems made it for you to do your work, take care of things at home, or get along with other people: Very difficult  Today's SPRING-7 Score: 12      Most recent lab results reviewed with pt.      Meeting patient for the second time period.   First time seen by me  was in May 2021 for tinea circinata.  No mental health issues at that time.  Has had ongoing constipation and abdominal pain symptoms.  Was worked up at the [facility] including colonoscopy, CT abdomen.  See GI notes and pelvic floor center notes from male in chart from September 2022 which were reviewed.  Patient also has had some banding through [facility].  Back in April he was found to have positive O&P stool studies.  Patient states he was treated medications for that without any change in symptoms.  With proctogram, patient states he was told he has impaired relaxation issues.  He was suggested to seek treatment for  "anxiety/depression as possible benefit for his GI symptoms.  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  He will go to work and then come home and then go to the gym in the evening and work out for a few hours before coming home and going to sleep.  Patient states when he is at home by himself, he is calm and relaxed.  Sometimes when he is at work at anxious or stressed and then feels like he has to have a bowel movement.  Sometimes go to the bathroom 2 or 3 times before he feels he is completely emptying his follow-up.  Patient has a second job that he does sometimes at night where he is by himself and then is calm during that time.  He is more anxious and groups or crowds.  Occasional anxiousness when driving.  No particular cause for his anxiety overall.  PHQ-9 and SPRING-7 scores as above reviewed.  Denies chest pain, shortness of breath with exercise and exercise does not worsen abdominal pain.  Denies seeing blood in his stools.       Additional ROS:   Constitutional, HEENT, Cardiovascular, Pulmonary, GI and , Neuro, MSK and Psych review of systems/symptoms are otherwise negative or unchanged from previous, except as noted above.      OBJECTIVE:  /74   Pulse 86   Temp 99.3  F (37.4  C) (Temporal)   Wt 63.4 kg (139 lb 11.2 oz)   SpO2 100%   BMI 22.55 kg/m     Estimated body mass index is 22.55 kg/m  as calculated from the following:    Height as of 3/31/22: 1.676 m (5' 6\").    Weight as of this encounter: 63.4 kg (139 lb 11.2 oz).     Neck: no adenopathy. Thyroid normal to palpation. No bruits  Pulm: Lungs clear to auscultation   CV: Regular rates and rhythm  GI: Soft, nontender, Normal active bowel sounds, No hepatosplenomegaly or masses palpable  Ext: Peripheral pulses intact. No edema.  Neuro: Normal strength and tone, sensory exam grossly normal  Gen: Anxious affect.  Normal dress, thought content and eye contact              "

## 2022-11-15 NOTE — PATIENT INSTRUCTIONS
Escitalopram 5mg tab. Start by taking 1/2 tab daily for 2 days. Then  increase the dose to 1 tab daily.  Take in the AM with food.  Possible initial side effects include jitteriness, nausea, insomnia. These are generally mild if present and resolve within a couple weeks. If severe side effects with the medication, then stop the medication and contact the clinic. Also contact the clinic immediately if you develop any suicidal ideation   Schedule follow-up virtual  visit with me  in 3 weeks  Referral to [facility]. They will call  to schedule  Slow  box breathing  techniques reviewed  for relaxation  Continue other management per GI

## 2022-12-16 ENCOUNTER — VIRTUAL VISIT (OUTPATIENT)
Dept: INTERNAL MEDICINE | Facility: CLINIC | Age: 27
End: 2022-12-16
Payer: COMMERCIAL

## 2022-12-16 DIAGNOSIS — F32.0 MILD MAJOR DEPRESSION (H): ICD-10-CM

## 2022-12-16 DIAGNOSIS — F41.9 ANXIETY: ICD-10-CM

## 2022-12-16 DIAGNOSIS — K58.9 IRRITABLE BOWEL SYNDROME, UNSPECIFIED TYPE: ICD-10-CM

## 2022-12-16 PROCEDURE — 99213 OFFICE O/P EST LOW 20 MIN: CPT | Mod: GT | Performed by: INTERNAL MEDICINE

## 2022-12-16 RX ORDER — ESCITALOPRAM OXALATE 10 MG/1
10 TABLET ORAL DAILY
Qty: 30 TABLET | Refills: 11 | Status: SHIPPED | OUTPATIENT
Start: 2022-12-16

## 2022-12-16 ASSESSMENT — ANXIETY QUESTIONNAIRES
8. IF YOU CHECKED OFF ANY PROBLEMS, HOW DIFFICULT HAVE THESE MADE IT FOR YOU TO DO YOUR WORK, TAKE CARE OF THINGS AT HOME, OR GET ALONG WITH OTHER PEOPLE?: SOMEWHAT DIFFICULT
2. NOT BEING ABLE TO STOP OR CONTROL WORRYING: SEVERAL DAYS
7. FEELING AFRAID AS IF SOMETHING AWFUL MIGHT HAPPEN: SEVERAL DAYS
5. BEING SO RESTLESS THAT IT IS HARD TO SIT STILL: NOT AT ALL
GAD7 TOTAL SCORE: 6
IF YOU CHECKED OFF ANY PROBLEMS ON THIS QUESTIONNAIRE, HOW DIFFICULT HAVE THESE PROBLEMS MADE IT FOR YOU TO DO YOUR WORK, TAKE CARE OF THINGS AT HOME, OR GET ALONG WITH OTHER PEOPLE: SOMEWHAT DIFFICULT
7. FEELING AFRAID AS IF SOMETHING AWFUL MIGHT HAPPEN: SEVERAL DAYS
3. WORRYING TOO MUCH ABOUT DIFFERENT THINGS: SEVERAL DAYS
1. FEELING NERVOUS, ANXIOUS, OR ON EDGE: SEVERAL DAYS
6. BECOMING EASILY ANNOYED OR IRRITABLE: SEVERAL DAYS
GAD7 TOTAL SCORE: 6
4. TROUBLE RELAXING: SEVERAL DAYS
GAD7 TOTAL SCORE: 6

## 2022-12-16 ASSESSMENT — PATIENT HEALTH QUESTIONNAIRE - PHQ9
10. IF YOU CHECKED OFF ANY PROBLEMS, HOW DIFFICULT HAVE THESE PROBLEMS MADE IT FOR YOU TO DO YOUR WORK, TAKE CARE OF THINGS AT HOME, OR GET ALONG WITH OTHER PEOPLE: NOT DIFFICULT AT ALL
SUM OF ALL RESPONSES TO PHQ QUESTIONS 1-9: 5
SUM OF ALL RESPONSES TO PHQ QUESTIONS 1-9: 5

## 2022-12-16 NOTE — PROGRESS NOTES
Rian is a 27 year old who is being evaluated via a billable video visit.      How would you like to obtain your AVS? Placelyhart  If the video visit is dropped, the invitation should be resent by: Text to cell phone: [phone]  Will anyone else be joining your video visit? No          ASSESSMENT:   1. Mild major depression (H)  Improved.  No suicidal ideation.  Increase Lexapro to 10 mg daily and will take at bedtime to reduce risk fatigue with med.  Patient will update me regarding symptom control in 3 to 4 weeks  - escitalopram (LEXAPRO) 10 MG tablet; Take 1 tablet (10 mg) by mouth daily In the later evening before bedtime  Dispense: 30 tablet; Refill: 11    2. Anxiety   GAD7 improved from 12 -> 6.  See plan below  - escitalopram (LEXAPRO) 10 MG tablet; Take 1 tablet (10 mg) by mouth daily In the later evening before bedtime  Dispense: 30 tablet; Refill: 11    3. Irritable bowel syndrome, unspecified type  Symptoms improved.  Stools more formed and less frequent.  Denies current abdominal pain. See plan below  - escitalopram (LEXAPRO) 10 MG tablet; Take 1 tablet (10 mg) by mouth daily In the later evening before bedtime  Dispense: 30 tablet; Refill: 11      PLAN:  Increase escitalopram to 10 mg tablet, 1 tablet daily and take medication at bedtime  Update me regarding symptom control in 3 to 4 weeks via [employer] message or earlier if side effects with higher dose escitalopram      Video-Visit Details    Type of service:  Video Visit    Video Start Time: 4:36 PM    Video End Time: 4:47 PM    Originating Location (pt. Location): Home    Distant Location (provider location):  [facility]     Platform used for Video Visit: Geovany Maradiaga MD  Internal Medicine Department  [facility]  Internal Medicine Department      (Chart documentation was completed, in part, with [employer] voice-recognition software. Even though reviewed, some grammatical, spelling,  and word errors may remain.)         Daisy Modi is a 27 year old, presenting for the following health issues:  Depression and Anxiety      History of Present Illness       Mental Health Follow-up:  Patient presents to follow-up on Depression & Anxiety.Patient's depression since last visit has been:  Medium  The patient is not having other symptoms associated with depression.  Patient's anxiety since last visit has been:  Medium  The patient is not having other symptoms associated with anxiety.  Any significant life events: financial concerns and health concerns  Patient is feeling anxious or having panic attacks.  Patient has no concerns about alcohol or drug use.    He eats 0-1 servings of fruits and vegetables daily.He consumes 2 sweetened beverage(s) daily.He exercises with enough effort to increase his heart rate 9 or less minutes per day.  He exercises with enough effort to increase his heart rate 3 or less days per week.   He is taking medications regularly.    Today's PHQ-9         PHQ-9 Total Score: 5    PHQ-9 Q9 Thoughts of better off dead/self-harm past 2 weeks :   Not at all    How difficult have these problems made it for you to do your work, take care of things at home, or get along with other people: Not difficult at all  Today's SPRING-7 Score: 6      Most recent lab results reviewed with pt.      Patient currently on Lexapro milligrams daily.  Anxiety and depression symptoms improved but still persist some. GAD7 decreased from 12 to 6, PHQ9 decreased from 6 to 5.  Denies suicidal ideation.  IBS symptoms better.  Less loose stools and cramping.  Minimal fatigue taking escitalopram.  Denies other side effects with medication.     Additional ROS:   Constitutional, HEENT, Cardiovascular, Pulmonary, GI and , Neuro, MSK and Psych review of systems/symptoms are otherwise negative or unchanged from previous, except as noted above.           Objective :  No vitals obtained today    Physical  Exam:  GENERAL:   alert and no distress  EYES: Eyes grossly normal to inspection, conjunctivae and sclerae normal  RESP: no audible wheeze, cough, or visible cyanosis.  No visible retractions or increased work of breathing.  Able to speak fully in complete sentences.  NEURO: Cranial nerves grossly intact, mentation intact and speech normal  PSYCH: mentation appears normal, affect calm, judgement and insight intact, normal speech and appearance well-groomed

## 2022-12-17 NOTE — PATIENT INSTRUCTIONS
Increase escitalopram to 10 mg tablet, 1 tablet daily and take medication at bedtime  Update me regarding symptom control in 3 to 4 weeks via [employer] message or earlier if side effects with higher dose escitalopram

## 2023-01-15 ENCOUNTER — MYC MEDICAL ADVICE (OUTPATIENT)
Dept: INTERNAL MEDICINE | Facility: CLINIC | Age: 28
End: 2023-01-15

## 2023-06-03 ENCOUNTER — HEALTH MAINTENANCE LETTER (OUTPATIENT)
Age: 28
End: 2023-06-03

## 2024-01-21 ASSESSMENT — PATIENT HEALTH QUESTIONNAIRE - PHQ9
SUM OF ALL RESPONSES TO PHQ QUESTIONS 1-9: 2
SUM OF ALL RESPONSES TO PHQ QUESTIONS 1-9: 2
10. IF YOU CHECKED OFF ANY PROBLEMS, HOW DIFFICULT HAVE THESE PROBLEMS MADE IT FOR YOU TO DO YOUR WORK, TAKE CARE OF THINGS AT HOME, OR GET ALONG WITH OTHER PEOPLE: SOMEWHAT DIFFICULT

## 2024-01-22 ENCOUNTER — OFFICE VISIT (OUTPATIENT)
Dept: INTERNAL MEDICINE | Facility: CLINIC | Age: 29
End: 2024-01-22
Payer: COMMERCIAL

## 2024-01-22 ENCOUNTER — TELEPHONE (OUTPATIENT)
Dept: INTERNAL MEDICINE | Facility: CLINIC | Age: 29
End: 2024-01-22

## 2024-01-22 VITALS
HEART RATE: 83 BPM | BODY MASS INDEX: 24.27 KG/M2 | OXYGEN SATURATION: 98 % | TEMPERATURE: 97.1 F | SYSTOLIC BLOOD PRESSURE: 128 MMHG | WEIGHT: 151 LBS | HEIGHT: 66 IN | RESPIRATION RATE: 16 BRPM | DIASTOLIC BLOOD PRESSURE: 76 MMHG

## 2024-01-22 DIAGNOSIS — K58.2 IRRITABLE BOWEL SYNDROME WITH BOTH CONSTIPATION AND DIARRHEA: ICD-10-CM

## 2024-01-22 DIAGNOSIS — R10.13 DYSPEPSIA: Primary | ICD-10-CM

## 2024-01-22 PROCEDURE — 99213 OFFICE O/P EST LOW 20 MIN: CPT | Performed by: PHYSICIAN ASSISTANT

## 2024-01-22 ASSESSMENT — PAIN SCALES - GENERAL: PAINLEVEL: MILD PAIN (3)

## 2024-01-22 NOTE — PROGRESS NOTES
"  Assessment & Plan     Dyspepsia    - Helicobacter pylori Antigen Stool; Future  - Helicobacter pylori Antigen Stool    Irritable bowel syndrome with both constipation and diarrhea              Recommend to see a PCP for follow up on concerns  Possibly seen gastroenterology back - digestive health.     See Patient Instructions    Daisy Modi is a 28 year old, presenting for the following health issues:  Abdominal Pain    History of Present Illness       Reason for visit:  Stomach issues    He eats 2-3 servings of fruits and vegetables daily.He consumes 1 sweetened beverage(s) daily.He exercises with enough effort to increase his heart rate 20 to 29 minutes per day.  He exercises with enough effort to increase his heart rate 6 days per week.   He is taking medications regularly.     Chronic issues with stomach  Seen by [facility]- see EPIC  Seen by Dr Maradiaga after workup with [place] and other providers. Last in 2022.   Has questions today about concerns about stomach cancer and labs to check for that  Also wondering about getting h pylori testing done too.     Reviewed chart, H pylori testing has not been done in the past  Issues have been mainly some gas bloating and diarrhea after eating foods. Ongoing for some time.                      Review of Systems  Constitutional, HEENT, cardiovascular, pulmonary, gi and gu systems are negative, except as otherwise noted.      Objective    /76 (BP Location: Left arm, Patient Position: Sitting, Cuff Size: Adult Large)   Pulse 83   Temp 97.1  F (36.2  C) (Oral)   Resp 16   Ht 1.676 m (5' 6\")   Wt 68.5 kg (151 lb)   SpO2 98%   BMI 24.37 kg/m    Body mass index is 24.37 kg/m .  Physical Exam   GENERAL: alert and no distress  RESP: lungs clear to auscultation - no rales, rhonchi or wheezes  CV: regular rates and rhythm and normal S1 S2, no S3 or S4  ABDOMEN: soft, nontender, no hepatosplenomegaly, no masses and bowel sounds normal  SKIN: no suspicious lesions " marcy hancock            Signed Electronically by: Tri Moore PA-C

## 2024-01-22 NOTE — TELEPHONE ENCOUNTER
Patient Contact    Attempt # 1    Was call answered?  No.  Left message on voicemail with information to call me back.    On call back, please gather more information on whether patient's reported symptoms of IBS are acute or chronic. If acute, can keep appt with Same Day Provider today. If chronic, provider recommends patient schedule establish care visit with available provider.

## 2024-07-13 ENCOUNTER — HEALTH MAINTENANCE LETTER (OUTPATIENT)
Age: 29
End: 2024-07-13